# Patient Record
Sex: MALE | Race: WHITE | Employment: UNEMPLOYED | URBAN - METROPOLITAN AREA
[De-identification: names, ages, dates, MRNs, and addresses within clinical notes are randomized per-mention and may not be internally consistent; named-entity substitution may affect disease eponyms.]

---

## 2017-05-15 ENCOUNTER — OFFICE VISIT (OUTPATIENT)
Dept: INTERNAL MEDICINE CLINIC | Age: 57
End: 2017-05-15
Payer: COMMERCIAL

## 2017-05-15 VITALS
BODY MASS INDEX: 34.79 KG/M2 | DIASTOLIC BLOOD PRESSURE: 80 MMHG | SYSTOLIC BLOOD PRESSURE: 122 MMHG | HEIGHT: 70 IN | WEIGHT: 243 LBS

## 2017-05-15 DIAGNOSIS — J01.10 ACUTE FRONTAL SINUSITIS, RECURRENCE NOT SPECIFIED: Primary | ICD-10-CM

## 2017-05-15 DIAGNOSIS — R09.81 SINUS CONGESTION: ICD-10-CM

## 2017-05-15 PROCEDURE — 99213 OFFICE O/P EST LOW 20 MIN: CPT | Performed by: NURSE PRACTITIONER

## 2017-05-15 RX ORDER — CHLORHEXIDINE GLUCONATE 0.12 MG/ML
RINSE ORAL
Refills: 0 | COMMUNITY
Start: 2017-05-09

## 2017-05-15 RX ORDER — FLUTICASONE PROPIONATE 50 MCG
1 SPRAY, SUSPENSION (ML) NASAL DAILY
Qty: 1 BOTTLE | Refills: 3 | Status: SHIPPED | OUTPATIENT
Start: 2017-05-15

## 2017-05-15 RX ORDER — LORATADINE 10 MG/1
10 TABLET ORAL DAILY
Qty: 14 TABLET | Refills: 0 | Status: SHIPPED | OUTPATIENT
Start: 2017-05-15 | End: 2022-10-13

## 2017-05-15 ASSESSMENT — PATIENT HEALTH QUESTIONNAIRE - PHQ9
1. LITTLE INTEREST OR PLEASURE IN DOING THINGS: 0
2. FEELING DOWN, DEPRESSED OR HOPELESS: 0
SUM OF ALL RESPONSES TO PHQ QUESTIONS 1-9: 0
SUM OF ALL RESPONSES TO PHQ9 QUESTIONS 1 & 2: 0

## 2017-05-15 ASSESSMENT — ENCOUNTER SYMPTOMS
RHINORRHEA: 1
SORE THROAT: 1
WHEEZING: 0
SHORTNESS OF BREATH: 1
SINUS PRESSURE: 1
COUGH: 1

## 2020-11-28 ENCOUNTER — HOSPITAL ENCOUNTER (EMERGENCY)
Age: 60
Discharge: HOME | End: 2020-11-28
Attending: EMERGENCY MEDICINE
Payer: COMMERCIAL

## 2020-11-28 VITALS
HEART RATE: 85 BPM | WEIGHT: 250 LBS | DIASTOLIC BLOOD PRESSURE: 95 MMHG | TEMPERATURE: 98.4 F | SYSTOLIC BLOOD PRESSURE: 156 MMHG | RESPIRATION RATE: 18 BRPM | HEIGHT: 70 IN | BODY MASS INDEX: 35.79 KG/M2 | OXYGEN SATURATION: 96 %

## 2020-11-28 PROCEDURE — 96372 THER/PROPH/DIAG INJ SC/IM: CPT

## 2020-11-28 PROCEDURE — 6360000002 HC RX W HCPCS: Performed by: EMERGENCY MEDICINE

## 2020-11-28 PROCEDURE — 99284 EMERGENCY DEPT VISIT MOD MDM: CPT

## 2020-11-28 RX ORDER — KETOROLAC TROMETHAMINE 30 MG/ML
30 INJECTION, SOLUTION INTRAMUSCULAR; INTRAVENOUS ONCE
Status: COMPLETED | OUTPATIENT
Start: 2020-11-28 | End: 2020-11-28

## 2020-11-28 RX ADMIN — KETOROLAC TROMETHAMINE 30 MG: 30 INJECTION, SOLUTION INTRAMUSCULAR; INTRAVENOUS at 10:42

## 2020-11-28 ASSESSMENT — PAIN SCALES - GENERAL
PAINLEVEL_OUTOF10: 8
PAINLEVEL_OUTOF10: 8

## 2020-11-28 ASSESSMENT — ENCOUNTER SYMPTOMS
DIARRHEA: 0
SHORTNESS OF BREATH: 0
ABDOMINAL PAIN: 0
VOMITING: 0
COUGH: 0
NAUSEA: 0
CONSTIPATION: 0

## 2020-11-28 NOTE — ED NOTES
Mode of arrival (squad #, walk in, police, etc) : Walk-in    Chief complaint(s): Foot Pain    Arrival Note (brief scenario, treatment PTA, etc). : Pt presents to ED c/o left foot pain x 3 days. Pt denies any injury. Pt reports this happened before, but he soaked it in Epsom salt and it got better. Pt reports he tried that again with no relief. Pt reports pain worsens with movement and walking. PMS intact. Pt is A&O x4, PWD, eupneic, and no distress noted. C= \"Have you ever felt that you should Cut down on your drinking? \"  No  A= \"Have people Annoyed you by criticizing your drinking? \"  No  G= \"Have you ever felt bad or Guilty about your drinking? \"  No  E= \"Have you ever had a drink as an Eye-opener first thing in the morning to steady your nerves or to help a hangover? \"  No      Deferred []      Reason for deferring: N/A    *If yes to two or more: probable alcohol abuse. Noris Fischer RN  11/28/20 4684

## 2020-11-28 NOTE — ED PROVIDER NOTES
[address] ED  eMERGENCY dEPARTMENT eNCOUnter      Pt Name: Lainey Lazaro  MRN: [id number]  [place] 1960  Date of evaluation: 11/28/20      CHIEF COMPLAINT       Chief Complaint   Patient presents with    Foot Pain     left         HISTORY OF PRESENT ILLNESS    Lainey Lazaro is a 56 y.o. male who presents complaining of foot pain. Patient states that he has been having some pain in his left foot where he has a bunion. Patient states last couple days has been walking quite a bit back-and-forth down to his neighbors to help her and is caused the pain to get worse. Patient has been taking Motrin 800 which seems to take the pain off a bit. Patient states because the walking the pain is gotten worse. Patient has no history of gout. REVIEW OF SYSTEMS       Review of Systems   Constitutional: Negative for chills and fever. Respiratory: Negative for cough and shortness of breath. Cardiovascular: Negative for chest pain and palpitations. Gastrointestinal: Negative for abdominal pain, constipation, diarrhea, nausea and vomiting. Musculoskeletal:        Foot pain   Neurological: Negative for dizziness, seizures and headaches. PAST MEDICAL HISTORY     Past Medical History:   Diagnosis Date    Acute bronchitis     Allergic rhinitis, cause unspecified     Backache, unspecified     Cervicalgia     Cough     Disorders of sacrum     Esophageal reflux     Obesity (BMI 30-39. 9)     Occlusion and stenosis of carotid artery without mention of cerebral infarction     2005    Other abnormal glucose     Other and unspecified hyperlipidemia     Other specified forms of chronic ischemic heart disease     Pain in joint, pelvic region and thigh     PONV (postoperative nausea and vomiting)     Ringing in the ears     Temporomandibular joint disorders, unspecified     Umbilical hernia     Unspecified essential hypertension     no meds    Unspecified vitamin D deficiency     Vasovagal episode     after coughing spell       SURGICAL HISTORY       Past Surgical History:   Procedure Laterality Date    KNEE SURGERY Left     arthroscopy    OTHER SURGICAL HISTORY Bilateral     plantar wart removal    UMBILICAL HERNIA REPAIR  1 4 15    c mesh    WRIST SURGERY Right     reconstructive surgery       CURRENT MEDICATIONS       Current Discharge Medication List      CONTINUE these medications which have NOT CHANGED    Details   chlorhexidine (PERIDEX) 0.12 % solution Rinse with 10 MILLILITERS by mouth then spit out. use twice a day  Refills: 0      loratadine (CLARITIN) 10 MG tablet Take 1 tablet by mouth daily  Qty: 14 tablet, Refills: 0    Associated Diagnoses: Acute frontal sinusitis, recurrence not specified; Sinus congestion      fluticasone (FLONASE) 50 MCG/ACT nasal spray 1 spray by Nasal route daily  Qty: 1 Bottle, Refills: 3    Associated Diagnoses: Acute frontal sinusitis, recurrence not specified; Sinus congestion      aspirin 325 MG tablet Take 325 mg by mouth daily Indications: 3 tabs at hs      Glucosamine-Fish Oil-EPA-DHA (GLUCOSAMINE & FISH OIL PO) Take 1 tablet by mouth daily      FISH OIL 1 tablet daily              ALLERGIES     is allergic to lipitor [atorvastatin]. SOCIAL HISTORY      reports that he quit smoking about 33 years ago. He has never used smokeless tobacco. He reports current alcohol use. He reports current drug use. Drug: Marijuana. PHYSICAL EXAM     INITIAL VITALS: BP (!) 156/95   Pulse 85   Temp 98.4 °F (36.9 °C) (Oral)   Resp 18   Ht 5' 10\" (1.778 m)   Wt 250 lb (113.4 kg)   SpO2 96%   BMI 35.87 kg/m²      Physical Exam  Vitals signs and nursing note reviewed. Constitutional:       General: He is not in acute distress. Appearance: He is well-developed. He is not diaphoretic. HENT:      Head: Normocephalic and atraumatic. Eyes:      General: No scleral icterus. Right eye: No discharge. Left eye: No discharge. Conjunctiva/sclera: Conjunctivae normal.      Pupils: Pupils are equal, round, and reactive to light. Pulmonary:      Effort: Pulmonary effort is normal. No respiratory distress. Musculoskeletal:      Comments: Examination of the left foot shows a bunion on the left with some swelling but no erythema no warmth but there is tenderness. Skin:     General: Skin is warm and dry. Coloration: Skin is not pale. Findings: No erythema or rash. Neurological:      Mental Status: He is alert and oriented to person, place, and time. Psychiatric:         Behavior: Behavior normal.         Thought Content: Thought content normal.         Judgment: Judgment normal.         DIAGNOSTIC RESULTS     RADIOLOGY:All plain film, CT,MRI, and formal ultrasound images (except ED bedside ultrasound) are read by the radiologist and the interpretations are directly viewed by the emergency physician. LABS: All lab results were reviewed by myself, and all abnormals are listed below. Labs Reviewed - No data to display      MEDICAL DECISION MAKING:     Patient symptoms are most likely related to overuse of the bunion with bad shoes as opposed to gout and therefore will have him follow-up with podiatry and continue using anti-inflammatories. EMERGENCY DEPARTMENT COURSE:   Vitals:    Vitals:    11/28/20 0935   BP: (!) 156/95   Pulse: 85   Resp: 18   Temp: 98.4 °F (36.9 °C)   TempSrc: Oral   SpO2: 96%   Weight: 250 lb (113.4 kg)   Height: 5' 10\" (1.778 m)       The patient was given the following medications while in the emergency department:  Orders Placed This Encounter   Medications    ketorolac (TORADOL) injection 30 mg       -------------------------      CONSULTS:  None    PROCEDURES:  None    FINAL IMPRESSION      1.  Left foot pain          DISPOSITION/PLAN   DISPOSITION Decision To Discharge 11/28/2020 10:23:20 AM      PATIENT REFERREDTO:  Santiago Welsh MD  [place] [address] [address] [facility] [address]  [phone]    In 3 days      [address]  [address]  [address] [facility] [address]  [phone]    If symptoms worsen      DISCHARGEMEDICATIONS:  Current Discharge Medication List          (Please note that portions of this note were completed with a voice recognition program.  Efforts were made to edit thedictations but occasionally words are mis-transcribed.)    Charlie Benoit MD  Attending Emergency Physician                        Charlie Benoit MD  11/28/20 5617

## 2020-11-30 ENCOUNTER — TELEPHONE (OUTPATIENT)
Dept: ORTHOPEDIC SURGERY | Age: 60
End: 2020-11-30

## 2020-11-30 NOTE — TELEPHONE ENCOUNTER
CHELSIEM to call and make appt with Dr. Veronica Mercado in the [facility] for tomorrow

## 2020-12-01 ENCOUNTER — TELEPHONE (OUTPATIENT)
Dept: ORTHOPEDIC SURGERY | Age: 60
End: 2020-12-01

## 2020-12-01 NOTE — TELEPHONE ENCOUNTER
----- Message from Karena Trevizo MD sent at 11/29/2020 11:28 AM EST -----  Hi,    I have reviewed this patient's cc'd chart and would like this patient to be scheduled for my clinic. Please let me know if there are any issues with this or any barriers to accommodating this request. Thanks in advance!      Sincerely,    Enrico Valdez MD  Orthopedic Surgery    ----- Message -----  From: Genesis Porras MD  Sent: 11/28/2020  11:01 AM EST  To: Karena Trevizo MD

## 2020-12-02 NOTE — TELEPHONE ENCOUNTER
LVM to call the office and make an appt.   Left the [facility] phone # to call and make an appt

## 2022-01-13 ENCOUNTER — HOSPITAL ENCOUNTER (OUTPATIENT)
Age: 62
Setting detail: SPECIMEN
Discharge: HOME | End: 2022-01-13

## 2022-01-13 ENCOUNTER — OFFICE VISIT (OUTPATIENT)
Dept: INTERNAL MEDICINE CLINIC | Age: 62
End: 2022-01-13
Payer: COMMERCIAL

## 2022-01-13 VITALS
HEIGHT: 70 IN | WEIGHT: 241 LBS | SYSTOLIC BLOOD PRESSURE: 136 MMHG | DIASTOLIC BLOOD PRESSURE: 88 MMHG | HEART RATE: 100 BPM | BODY MASS INDEX: 34.5 KG/M2 | OXYGEN SATURATION: 97 %

## 2022-01-13 DIAGNOSIS — I10 ESSENTIAL HYPERTENSION: ICD-10-CM

## 2022-01-13 DIAGNOSIS — Z12.11 SCREENING FOR COLON CANCER: ICD-10-CM

## 2022-01-13 DIAGNOSIS — G56.03 BILATERAL CARPAL TUNNEL SYNDROME: ICD-10-CM

## 2022-01-13 DIAGNOSIS — I10 ESSENTIAL HYPERTENSION: Primary | ICD-10-CM

## 2022-01-13 DIAGNOSIS — M75.42 IMPINGEMENT SYNDROME OF LEFT SHOULDER: ICD-10-CM

## 2022-01-13 DIAGNOSIS — R73.9 HYPERGLYCEMIA: ICD-10-CM

## 2022-01-13 DIAGNOSIS — E55.9 VITAMIN D DEFICIENCY: ICD-10-CM

## 2022-01-13 DIAGNOSIS — Z13.220 SCREENING FOR HYPERLIPIDEMIA: ICD-10-CM

## 2022-01-13 DIAGNOSIS — E66.09 CLASS 1 OBESITY DUE TO EXCESS CALORIES WITH SERIOUS COMORBIDITY AND BODY MASS INDEX (BMI) OF 34.0 TO 34.9 IN ADULT: ICD-10-CM

## 2022-01-13 DIAGNOSIS — K21.9 GASTROESOPHAGEAL REFLUX DISEASE WITHOUT ESOPHAGITIS: ICD-10-CM

## 2022-01-13 DIAGNOSIS — N40.1 BENIGN LOCALIZED PROSTATIC HYPERPLASIA WITH LOWER URINARY TRACT SYMPTOMS (LUTS): ICD-10-CM

## 2022-01-13 PROBLEM — E66.811 CLASS 1 OBESITY DUE TO EXCESS CALORIES WITH SERIOUS COMORBIDITY AND BODY MASS INDEX (BMI) OF 34.0 TO 34.9 IN ADULT: Status: ACTIVE | Noted: 2022-01-13

## 2022-01-13 LAB
ABSOLUTE EOS #: 0.15 K/UL (ref 0–0.44)
ABSOLUTE IMMATURE GRANULOCYTE: 0.05 K/UL (ref 0–0.3)
ABSOLUTE LYMPH #: 2.74 K/UL (ref 1.1–3.7)
ABSOLUTE MONO #: 0.77 K/UL (ref 0.1–1.2)
ALBUMIN SERPL-MCNC: 4.2 G/DL (ref 3.5–5.2)
ALBUMIN/GLOBULIN RATIO: 1.6 (ref 1–2.5)
ALP BLD-CCNC: 59 U/L (ref 40–129)
ALT SERPL-CCNC: 39 U/L (ref 5–41)
ANION GAP SERPL CALCULATED.3IONS-SCNC: 15 MMOL/L (ref 9–17)
AST SERPL-CCNC: 31 U/L
BASOPHILS # BLD: 1 % (ref 0–2)
BASOPHILS ABSOLUTE: 0.04 K/UL (ref 0–0.2)
BILIRUB SERPL-MCNC: 0.32 MG/DL (ref 0.3–1.2)
BUN BLDV-MCNC: 14 MG/DL (ref 8–23)
BUN/CREAT BLD: ABNORMAL (ref 9–20)
CALCIUM SERPL-MCNC: 9.1 MG/DL (ref 8.6–10.4)
CHLORIDE BLD-SCNC: 98 MMOL/L (ref 98–107)
CHOLESTEROL/HDL RATIO: 16.6
CHOLESTEROL: 432 MG/DL
CO2: 22 MMOL/L (ref 20–31)
CREAT SERPL-MCNC: 0.83 MG/DL (ref 0.7–1.2)
CREATININE URINE: 277.9 MG/DL (ref 39–259)
DIFFERENTIAL TYPE: ABNORMAL
EOSINOPHILS RELATIVE PERCENT: 2 % (ref 1–4)
GFR AFRICAN AMERICAN: >60 ML/MIN
GFR NON-AFRICAN AMERICAN: >60 ML/MIN
GFR SERPL CREATININE-BSD FRML MDRD: ABNORMAL ML/MIN/{1.73_M2}
GFR SERPL CREATININE-BSD FRML MDRD: ABNORMAL ML/MIN/{1.73_M2}
GLUCOSE BLD-MCNC: 303 MG/DL (ref 70–99)
HCT VFR BLD CALC: 48.4 % (ref 40.7–50.3)
HDLC SERPL-MCNC: 26 MG/DL
HEMOGLOBIN: 16.2 G/DL (ref 13–17)
IMMATURE GRANULOCYTES: 1 %
LDL CHOLESTEROL DIRECT: 110 MG/DL
LDL CHOLESTEROL: ABNORMAL MG/DL (ref 0–130)
LYMPHOCYTES # BLD: 38 % (ref 24–43)
MCH RBC QN AUTO: 29.9 PG (ref 25.2–33.5)
MCHC RBC AUTO-ENTMCNC: 33.5 G/DL (ref 28.4–34.8)
MCV RBC AUTO: 89.5 FL (ref 82.6–102.9)
MICROALBUMIN/CREAT 24H UR: 48 MG/L
MICROALBUMIN/CREAT UR-RTO: 17 MCG/MG CREAT
MONOCYTES # BLD: 11 % (ref 3–12)
NRBC AUTOMATED: 0 PER 100 WBC
PDW BLD-RTO: 12.7 % (ref 11.8–14.4)
PLATELET # BLD: 227 K/UL (ref 138–453)
PLATELET ESTIMATE: ABNORMAL
PMV BLD AUTO: 11 FL (ref 8.1–13.5)
POTASSIUM SERPL-SCNC: 4.2 MMOL/L (ref 3.7–5.3)
PROSTATE SPECIFIC ANTIGEN: 1.08 UG/L
RBC # BLD: 5.41 M/UL (ref 4.21–5.77)
RBC # BLD: ABNORMAL 10*6/UL
SEG NEUTROPHILS: 47 % (ref 36–65)
SEGMENTED NEUTROPHILS ABSOLUTE COUNT: 3.52 K/UL (ref 1.5–8.1)
SODIUM BLD-SCNC: 135 MMOL/L (ref 135–144)
TOTAL PROTEIN: 6.8 G/DL (ref 6.4–8.3)
TRIGL SERPL-MCNC: 1251 MG/DL
VLDLC SERPL CALC-MCNC: ABNORMAL MG/DL (ref 1–30)
WBC # BLD: 7.3 K/UL (ref 3.5–11.3)
WBC # BLD: ABNORMAL 10*3/UL

## 2022-01-13 PROCEDURE — G8427 DOCREV CUR MEDS BY ELIG CLIN: HCPCS | Performed by: INTERNAL MEDICINE

## 2022-01-13 PROCEDURE — 1036F TOBACCO NON-USER: CPT | Performed by: INTERNAL MEDICINE

## 2022-01-13 PROCEDURE — 3017F COLORECTAL CA SCREEN DOC REV: CPT | Performed by: INTERNAL MEDICINE

## 2022-01-13 PROCEDURE — G8417 CALC BMI ABV UP PARAM F/U: HCPCS | Performed by: INTERNAL MEDICINE

## 2022-01-13 PROCEDURE — G8484 FLU IMMUNIZE NO ADMIN: HCPCS | Performed by: INTERNAL MEDICINE

## 2022-01-13 PROCEDURE — 99204 OFFICE O/P NEW MOD 45 MIN: CPT | Performed by: INTERNAL MEDICINE

## 2022-01-13 SDOH — ECONOMIC STABILITY: FOOD INSECURITY: WITHIN THE PAST 12 MONTHS, YOU WORRIED THAT YOUR FOOD WOULD RUN OUT BEFORE YOU GOT MONEY TO BUY MORE.: NEVER TRUE

## 2022-01-13 SDOH — ECONOMIC STABILITY: FOOD INSECURITY: WITHIN THE PAST 12 MONTHS, THE FOOD YOU BOUGHT JUST DIDN'T LAST AND YOU DIDN'T HAVE MONEY TO GET MORE.: NEVER TRUE

## 2022-01-13 ASSESSMENT — ENCOUNTER SYMPTOMS
BLOOD IN STOOL: 0
EYE PAIN: 0
WHEEZING: 0
EYE DISCHARGE: 0
COLOR CHANGE: 0
ABDOMINAL DISTENTION: 0
TROUBLE SWALLOWING: 0
DIARRHEA: 0
SHORTNESS OF BREATH: 0
COUGH: 0

## 2022-01-13 ASSESSMENT — PATIENT HEALTH QUESTIONNAIRE - PHQ9
1. LITTLE INTEREST OR PLEASURE IN DOING THINGS: 0
SUM OF ALL RESPONSES TO PHQ QUESTIONS 1-9: 0
1. LITTLE INTEREST OR PLEASURE IN DOING THINGS: 0
SUM OF ALL RESPONSES TO PHQ QUESTIONS 1-9: 0
SUM OF ALL RESPONSES TO PHQ QUESTIONS 1-9: 0
SUM OF ALL RESPONSES TO PHQ9 QUESTIONS 1 & 2: 0
SUM OF ALL RESPONSES TO PHQ QUESTIONS 1-9: 0
2. FEELING DOWN, DEPRESSED OR HOPELESS: 0
2. FEELING DOWN, DEPRESSED OR HOPELESS: 0
SUM OF ALL RESPONSES TO PHQ QUESTIONS 1-9: 0
SUM OF ALL RESPONSES TO PHQ QUESTIONS 1-9: 0
SUM OF ALL RESPONSES TO PHQ9 QUESTIONS 1 & 2: 0
SUM OF ALL RESPONSES TO PHQ QUESTIONS 1-9: 0
SUM OF ALL RESPONSES TO PHQ QUESTIONS 1-9: 0

## 2022-01-13 ASSESSMENT — SOCIAL DETERMINANTS OF HEALTH (SDOH): HOW HARD IS IT FOR YOU TO PAY FOR THE VERY BASICS LIKE FOOD, HOUSING, MEDICAL CARE, AND HEATING?: NOT HARD AT ALL

## 2022-01-13 NOTE — PROGRESS NOTES
[address] [address]  Dept: [phone]  Dept Fax: [phone]    Thad Martin is a 59 y.o. male who presents today for his medical conditions/complaintsas noted below. Thad Martin is c/o of   Chief Complaint   Patient presents with    New Patient         HPI:     HTN  Onset more than 2 years ago  staci mild to mod  Controlled with current no meds  Not associated with headaches or blurry vision  No chest pain          Hemoglobin A1C (%)   Date Value   05/28/2013 6.2 (H)   12/10/2012 6.2 (H)             ( goal A1Cis < 7)   No results found for: LABMICR  LDL Cholesterol (mg/dL)   Date Value   08/12/2015 07/22/2013 129 (H)   05/28/2013 Unable to calc. as TRIG>400       (goal LDL is <100)   AST (U/L)   Date Value   08/12/2015 16     ALT (U/L)   Date Value   08/12/2015 22     BUN (mg/dL)   Date Value   01/04/2016 19     BP Readings from Last 3 Encounters:   01/13/22 136/88   11/28/20 (!) 156/95   05/15/17 122/80          (goal 120/80)    Past Medical History:   Diagnosis Date    Acute bronchitis     Allergic rhinitis, cause unspecified     Backache, unspecified     Cervicalgia     Cough     Disorders of sacrum     Esophageal reflux     Obesity (BMI 30-39. 9)     Occlusion and stenosis of carotid artery without mention of cerebral infarction     2005    Other abnormal glucose     Other and unspecified hyperlipidemia     Other specified forms of chronic ischemic heart disease     Pain in joint, pelvic region and thigh     PONV (postoperative nausea and vomiting)     Ringing in the ears     Temporomandibular joint disorders, unspecified     Umbilical hernia     Unspecified essential hypertension     no meds    Unspecified vitamin D deficiency     Vasovagal episode     after coughing spell      Past Surgical History:   Procedure Laterality Date    KNEE SURGERY Left     arthroscopy    OTHER SURGICAL HISTORY Bilateral plantar wart removal    UMBILICAL HERNIA REPAIR  1 4 15    c mesh    WRIST SURGERY Right     reconstructive surgery       Family History   Problem Relation Age of Onset    Seizures Father         epilepsy    COPD Mother        Social History     Tobacco Use    Smoking status: Former Smoker     Quit date: 1987     Years since quittin.2    Smokeless tobacco: Never Used   Substance Use Topics    Alcohol use: Yes     Alcohol/week: 0.0 standard drinks     Comment: rare      Current Outpatient Medications   Medication Sig Dispense Refill    chlorhexidine (PERIDEX) 0.12 % solution Rinse with 10 MILLILITERS by mouth then spit out. use twice a day  0    loratadine (CLARITIN) 10 MG tablet Take 1 tablet by mouth daily 14 tablet 0    fluticasone (FLONASE) 50 MCG/ACT nasal spray 1 spray by Nasal route daily 1 Bottle 3    aspirin 325 MG tablet Take 325 mg by mouth daily Indications: 3 tabs at hs      Glucosamine-Fish Oil-EPA-DHA (GLUCOSAMINE & FISH OIL PO) Take 1 tablet by mouth daily      FISH OIL 1 tablet daily        No current facility-administered medications for this visit.      Allergies   Allergen Reactions    Lipitor [Atorvastatin] Anaphylaxis     Leg cramps       Health Maintenance   Topic Date Due    Hepatitis C screen  Never done    COVID-19 Vaccine (1) Never done    Depression Screen  Never done    HIV screen  Never done    DTaP/Tdap/Td vaccine (1 - Tdap) Never done    Colon cancer screen colonoscopy  Never done    Shingles Vaccine (1 of 2) Never done    A1C test (Diabetic or Prediabetic)  2014    Potassium monitoring  2017    Creatinine monitoring  2017    Lipid screen  2020    Flu vaccine (1) Never done    Hepatitis A vaccine  Aged Out    Hepatitis B vaccine  Aged Out    Hib vaccine  Aged Out    Meningococcal (ACWY) vaccine  Aged Out    Pneumococcal 0-64 years Vaccine  Aged Out       Subjective:     Review of Systems   Constitutional: Negative for appetite change, diaphoresis and fatigue. HENT: Negative for ear discharge and trouble swallowing. Eyes: Negative for pain and discharge. Respiratory: Negative for cough, shortness of breath and wheezing. Cardiovascular: Negative for chest pain and palpitations. Gastrointestinal: Negative for abdominal distention, blood in stool and diarrhea. Endocrine: Negative for polydipsia and polyphagia. Genitourinary: Negative for difficulty urinating and frequency. Musculoskeletal: Positive for arthralgias. Negative for gait problem, myalgias and neck pain. Skin: Negative for color change and rash. Allergic/Immunologic: Negative for environmental allergies and food allergies. Neurological: Negative for dizziness and headaches. Hematological: Negative for adenopathy. Does not bruise/bleed easily. Psychiatric/Behavioral: Negative for behavioral problems and sleep disturbance. Objective:     Physical Exam  Constitutional:       Appearance: He is well-developed. He is not diaphoretic. Comments: Obese     HENT:      Head: Normocephalic and atraumatic. Eyes:      General:         Right eye: No discharge. Left eye: No discharge. Extraocular Movements:      Right eye: Normal extraocular motion. Left eye: Normal extraocular motion. Conjunctiva/sclera: Conjunctivae normal.      Right eye: Right conjunctiva is not injected. Left eye: Left conjunctiva is not injected. Neck:      Thyroid: No thyroid mass or thyromegaly. Vascular: No JVD. Cardiovascular:      Rate and Rhythm: Normal rate and regular rhythm. Heart sounds: No murmur heard. No friction rub. Pulmonary:      Effort: Pulmonary effort is normal. No tachypnea, bradypnea, accessory muscle usage or respiratory distress. Breath sounds: Normal breath sounds. No wheezing or rales. Abdominal:      General: Bowel sounds are normal. There is no distension. Palpations: Abdomen is soft. Tenderness: There is no abdominal tenderness. There is no rebound. Musculoskeletal:         General: No tenderness. Normal range of motion. Cervical back: Normal range of motion and neck supple. No edema or erythema. Lymphadenopathy:      Head:      Right side of head: No submental or submandibular adenopathy. Left side of head: No submental or submandibular adenopathy. Cervical: No cervical adenopathy. Skin:     General: Skin is warm. Coloration: Skin is not pale. Findings: No bruising, ecchymosis or rash. Neurological:      Mental Status: He is alert and oriented to person, place, and time. Cranial Nerves: No cranial nerve deficit. Sensory: No sensory deficit. Motor: No atrophy or abnormal muscle tone. Coordination: Coordination normal.   Psychiatric:         Mood and Affect: Mood is not anxious. Affect is not angry. Speech: Speech is not slurred. Behavior: Behavior normal. Behavior is not aggressive. Thought Content: Thought content does not include homicidal ideation. Cognition and Memory: Memory is not impaired. /88   Pulse 100   Ht 5' 10\" (1.778 m)   Wt 241 lb (109.3 kg)   SpO2 97%   BMI 34.58 kg/m²     Assessment:       Diagnosis Orders   1. Essential hypertension  Microalbumin, Ur    CBC Auto Differential   2. Screening for colon cancer     3. Screening for hyperlipidemia  Lipid, Fasting    Lipid Panel    Comprehensive Metabolic Panel   4. Impingement syndrome of left shoulder     5. Vitamin D deficiency     6. Gastroesophageal reflux disease without esophagitis     7. Bilateral carpal tunnel syndrome     8. Class 1 obesity due to excess calories with serious comorbidity and body mass index (BMI) of 34.0 to 34.9 in adult     9. Benign localized prostatic hyperplasia with lower urinary tract symptoms (LUTS)  PSA screening   10.  Hyperglycemia  Hemoglobin A1C    Hemoglobin A1C             Plan:      No follow-ups on file. Orders Placed This Encounter   Procedures    Lipid, Fasting     Standing Status:   Future     Standing Expiration Date:   1/12/2023    Hemoglobin A1C     Standing Status:   Future     Standing Expiration Date:   2/13/2022    Lipid Panel     Standing Status:   Future     Standing Expiration Date:   4/13/2022     Order Specific Question:   Is Patient Fasting?/# of Hours     Answer:   10 to 12    Comprehensive Metabolic Panel     Standing Status:   Future     Standing Expiration Date:   4/13/2022    Hemoglobin A1C     Standing Status:   Future     Standing Expiration Date:   4/13/2022    Microalbumin, Ur     Standing Status:   Future     Standing Expiration Date:   4/13/2022    CBC Auto Differential     Standing Status:   Future     Standing Expiration Date:   4/13/2022    PSA screening     Standing Status:   Future     Standing Expiration Date:   4/13/2022     No orders of the defined types were placed in this encounter. new pt   Seen bomanna before ,not here in five year  Concern of dm  Not taking meds  Increased night frequency and thirsty and polyuria  diane do above test  See back in two weeks     Patient given educational materials - see patient instructions. Discussed use, benefit, and side effects of prescribed medications. All patientquestions answered. Pt voiced understanding. Reviewed health maintenance. Instructedto continue current medications, diet and exercise. Patient agreed with treatmentplan. Follow up as directed.      Electronically signed by Phuc Sargent MD on 1/13/2022 at 1:47 PM

## 2022-01-13 NOTE — PROGRESS NOTES
Visit Information    Have you changed or started any medications since your last visit including any over-the-counter medicines, vitamins, or herbal medicines? no   Are you having any side effects from any of your medications? -  no  Have you stopped taking any of your medications? Is so, why? -  no    Have you seen any other physician or provider since your last visit? No  Have you had any other diagnostic tests since your last visit? No  Have you been seen in the emergency room and/or had an admission to a hospital since we last saw you? No  Have you had your routine dental cleaning in the past 6 months? yes -     Have you activated your [employer] account? If not, what are your barriers?  No:      Patient Care Team:  Dana Forte MD as Consulting Physician (Gastroenterology)    Medical History Review  Past Medical, Family, and Social History reviewed and does contribute to the patient presenting condition    Health Maintenance   Topic Date Due    Hepatitis C screen  Never done    COVID-19 Vaccine (1) Never done    Depression Screen  Never done    HIV screen  Never done    DTaP/Tdap/Td vaccine (1 - Tdap) Never done    Colon cancer screen colonoscopy  Never done    Shingles Vaccine (1 of 2) Never done    A1C test (Diabetic or Prediabetic)  05/28/2014    Potassium monitoring  01/04/2017    Creatinine monitoring  01/04/2017    Lipid screen  08/12/2020    Flu vaccine (1) Never done    Hepatitis A vaccine  Aged Out    Hepatitis B vaccine  Aged Out    Hib vaccine  Aged Out    Meningococcal (ACWY) vaccine  Aged Out    Pneumococcal 0-64 years Vaccine  Aged Out

## 2022-01-14 LAB
ESTIMATED AVERAGE GLUCOSE: 355 MG/DL
HBA1C MFR BLD: 14 % (ref 4–6)

## 2022-02-03 ENCOUNTER — TELEMEDICINE (OUTPATIENT)
Dept: INTERNAL MEDICINE CLINIC | Age: 62
End: 2022-02-03
Payer: COMMERCIAL

## 2022-02-03 DIAGNOSIS — E78.1 HYPERTRIGLYCERIDEMIA: ICD-10-CM

## 2022-02-03 PROCEDURE — G8417 CALC BMI ABV UP PARAM F/U: HCPCS | Performed by: INTERNAL MEDICINE

## 2022-02-03 PROCEDURE — 3046F HEMOGLOBIN A1C LEVEL >9.0%: CPT | Performed by: INTERNAL MEDICINE

## 2022-02-03 PROCEDURE — G8428 CUR MEDS NOT DOCUMENT: HCPCS | Performed by: INTERNAL MEDICINE

## 2022-02-03 PROCEDURE — 3017F COLORECTAL CA SCREEN DOC REV: CPT | Performed by: INTERNAL MEDICINE

## 2022-02-03 PROCEDURE — G8484 FLU IMMUNIZE NO ADMIN: HCPCS | Performed by: INTERNAL MEDICINE

## 2022-02-03 PROCEDURE — 2022F DILAT RTA XM EVC RTNOPTHY: CPT | Performed by: INTERNAL MEDICINE

## 2022-02-03 PROCEDURE — 99214 OFFICE O/P EST MOD 30 MIN: CPT | Performed by: INTERNAL MEDICINE

## 2022-02-03 PROCEDURE — 1036F TOBACCO NON-USER: CPT | Performed by: INTERNAL MEDICINE

## 2022-02-03 RX ORDER — FENOFIBRATE 145 MG/1
145 TABLET, COATED ORAL DAILY
Qty: 30 TABLET | Refills: 3 | Status: SHIPPED | OUTPATIENT
Start: 2022-02-03 | End: 2022-07-08

## 2022-02-03 RX ORDER — BLOOD-GLUCOSE METER
1 KIT MISCELLANEOUS 3 TIMES DAILY
Qty: 1 KIT | Refills: 0 | Status: SHIPPED | OUTPATIENT
Start: 2022-02-03

## 2022-02-03 RX ORDER — GLIMEPIRIDE 4 MG/1
4 TABLET ORAL
Qty: 90 TABLET | Refills: 1 | Status: SHIPPED | OUTPATIENT
Start: 2022-02-03 | End: 2022-08-01

## 2022-02-03 NOTE — PROGRESS NOTES
[address] [address]  Dept: [phone]  Dept Fax: [phone]    Maykel Bautista is a 59 y.o. male who presents today for his medical conditions/complaintsas noted below. Maykel Bautista is c/o of   Chief Complaint   Patient presents with    Diabetes         HPI:dm     Diabetes   Duration more than 7 years  Modifying factors on Glucophage and other med  Severity uncontrolled sever  Associated signs and symtoms neuropathy/ckd/ CAD. aggravated with sugar diet and better with low sugar diet     HLD  Onset more than 5 years ago  Severity is mild, not getting worse  Not associated with pancreatitis  Tolerating statin well no muscle pain        Hemoglobin A1C (%)   Date Value   01/13/2022 14.0 (H)   05/28/2013 6.2 (H)   12/10/2012 6.2 (H)             ( goal A1Cis < 7)   Microalb/Crt. Ratio (mcg/mg creat)   Date Value   01/13/2022 17 (H)     LDL Cholesterol (mg/dL)   Date Value   01/13/2022 08/12/2015 07/22/2013 129 (H)       (goal LDL is <100)   AST (U/L)   Date Value   01/13/2022 31     ALT (U/L)   Date Value   01/13/2022 39     BUN (mg/dL)   Date Value   01/13/2022 14     BP Readings from Last 3 Encounters:   01/13/22 136/88   11/28/20 (!) 156/95   05/15/17 122/80          (goal 120/80)    Past Medical History:   Diagnosis Date    Acute bronchitis     Allergic rhinitis, cause unspecified     Backache, unspecified     Cervicalgia     Cough     Disorders of sacrum     Esophageal reflux     Obesity (BMI 30-39. 9)     Occlusion and stenosis of carotid artery without mention of cerebral infarction     2005    Other abnormal glucose     Other and unspecified hyperlipidemia     Other specified forms of chronic ischemic heart disease     Pain in joint, pelvic region and thigh     PONV (postoperative nausea and vomiting)     Ringing in the ears     Temporomandibular joint disorders, unspecified     Umbilical hernia     Unspecified essential hypertension     no meds    Unspecified vitamin D deficiency     Vasovagal episode     after coughing spell      Past Surgical History:   Procedure Laterality Date    KNEE SURGERY Left     arthroscopy    OTHER SURGICAL HISTORY Bilateral     plantar wart removal    UMBILICAL HERNIA REPAIR  1 4 15    c mesh    WRIST SURGERY Right     reconstructive surgery       Family History   Problem Relation Age of Onset    Seizures Father         epilepsy    COPD Mother        Social History     Tobacco Use    Smoking status: Former Smoker     Quit date: 1987     Years since quittin.4    Smokeless tobacco: Never Used   Substance Use Topics    Alcohol use: Yes     Alcohol/week: 0.0 standard drinks     Comment: rare      Current Outpatient Medications   Medication Sig Dispense Refill    fenofibrate (TRICOR) 145 MG tablet Take 1 tablet by mouth daily 30 tablet 3    metFORMIN (GLUCOPHAGE) 1000 MG tablet Take 1 tablet by mouth 2 times daily (with meals) 180 tablet 1    glimepiride (AMARYL) 4 MG tablet Take 1 tablet by mouth every morning (before breakfast) 90 tablet 1    blood glucose test strips (ASCENSIA AUTODISC VI;ONE TOUCH ULTRA TEST VI) strip Use with associated glucose meter. 100 strip 11    glucose monitoring (FREESTYLE FREEDOM) kit 1 kit by Does not apply route 3 times daily 1 kit 0    chlorhexidine (PERIDEX) 0.12 % solution Rinse with 10 MILLILITERS by mouth then spit out. use twice a day  0    loratadine (CLARITIN) 10 MG tablet Take 1 tablet by mouth daily 14 tablet 0    fluticasone (FLONASE) 50 MCG/ACT nasal spray 1 spray by Nasal route daily 1 Bottle 3    aspirin 325 MG tablet Take 325 mg by mouth daily Indications: 3 tabs at hs      Glucosamine-Fish Oil-EPA-DHA (GLUCOSAMINE & FISH OIL PO) Take 1 tablet by mouth daily      FISH OIL 1 tablet daily        No current facility-administered medications for this visit.      Allergies   Allergen Reactions    Lipitor [Atorvastatin] Anaphylaxis     Leg cramps       Health Maintenance   Topic Date Due    COVID-19 Vaccine (1) Never done    Pneumococcal 0-64 years Vaccine (1 - PCV) Never done    Diabetic foot exam  Never done    HIV screen  Never done    Diabetic retinal exam  Never done    Hepatitis C screen  Never done    DTaP/Tdap/Td vaccine (1 - Tdap) Never done    Colorectal Cancer Screen  Never done    Shingles vaccine (1 of 2) Never done    A1C test (Diabetic or Prediabetic)  04/13/2022    Flu vaccine (Season Ended) 09/01/2022    Diabetic microalbuminuria test  01/13/2023    Lipids  01/13/2023    Depression Screen  01/13/2023    Potassium  01/13/2023    Creatinine  01/13/2023    Hepatitis A vaccine  Aged Out    Hib vaccine  Aged Out    Meningococcal (ACWY) vaccine  Aged Out       Subjective:     Review of Systems   Constitutional: Negative for appetite change, diaphoresis and fatigue. HENT: Negative for ear discharge and trouble swallowing. Eyes: Negative for pain and discharge. Respiratory: Negative for cough, shortness of breath and wheezing. Cardiovascular: Negative for chest pain and palpitations. Gastrointestinal: Negative for abdominal distention, blood in stool and diarrhea. Endocrine: Negative for polydipsia and polyphagia. Genitourinary: Negative for difficulty urinating and frequency. Musculoskeletal: Negative for gait problem, myalgias and neck pain. Skin: Negative for color change and rash. Allergic/Immunologic: Negative for environmental allergies and food allergies. Neurological: Negative for dizziness and headaches. Hematological: Negative for adenopathy. Does not bruise/bleed easily. Psychiatric/Behavioral: Negative for behavioral problems and sleep disturbance. Objective:     Physical Exam  Constitutional:       Appearance: He is well-developed. He is not diaphoretic. HENT:      Head: Normocephalic and atraumatic.    Eyes:      General:         Right eye: No discharge. Left eye: No discharge. Extraocular Movements:      Right eye: Normal extraocular motion. Left eye: Normal extraocular motion. Conjunctiva/sclera: Conjunctivae normal.      Right eye: Right conjunctiva is not injected. Left eye: Left conjunctiva is not injected. Neck:      Thyroid: No thyroid mass or thyromegaly. Vascular: No JVD. Cardiovascular:      Rate and Rhythm: Normal rate and regular rhythm. Heart sounds: No murmur heard. No friction rub. Pulmonary:      Effort: Pulmonary effort is normal. No tachypnea, bradypnea, accessory muscle usage or respiratory distress. Breath sounds: Normal breath sounds. No wheezing or rales. Abdominal:      General: Bowel sounds are normal. There is no distension. Palpations: Abdomen is soft. Tenderness: There is no abdominal tenderness. There is no rebound. Musculoskeletal:         General: No tenderness. Normal range of motion. Cervical back: Normal range of motion and neck supple. No edema or erythema. Lymphadenopathy:      Head:      Right side of head: No submental or submandibular adenopathy. Left side of head: No submental or submandibular adenopathy. Cervical: No cervical adenopathy. Skin:     General: Skin is warm. Coloration: Skin is not pale. Findings: No bruising, ecchymosis or rash. Neurological:      Mental Status: He is alert and oriented to person, place, and time. Cranial Nerves: No cranial nerve deficit. Sensory: No sensory deficit. Motor: No atrophy or abnormal muscle tone. Coordination: Coordination normal.   Psychiatric:         Mood and Affect: Mood is not anxious. Affect is not angry. Speech: Speech is not slurred. Behavior: Behavior normal. Behavior is not aggressive. Thought Content: Thought content does not include homicidal ideation. Cognition and Memory: Memory is not impaired.        There were no vitals taken for this visit. Assessment:       Diagnosis Orders   1. DM type 2, uncontrolled, with renal complications ([facility] [address]     2. Hypertriglyceridemia               Plan:      Return in about 1 month (around 3/3/2022). No orders of the defined types were placed in this encounter. Orders Placed This Encounter   Medications    fenofibrate (TRICOR) 145 MG tablet     Sig: Take 1 tablet by mouth daily     Dispense:  30 tablet     Refill:  3    metFORMIN (GLUCOPHAGE) 1000 MG tablet     Sig: Take 1 tablet by mouth 2 times daily (with meals)     Dispense:  180 tablet     Refill:  1    glimepiride (AMARYL) 4 MG tablet     Sig: Take 1 tablet by mouth every morning (before breakfast)     Dispense:  90 tablet     Refill:  1    blood glucose test strips (ASCENSIA AUTODISC VI;ONE TOUCH ULTRA TEST VI) strip     Sig: Use with associated glucose meter. Dispense:  100 strip     Refill:  11    glucose monitoring (FREESTYLE FREEDOM) kit     Si kit by Does not apply route 3 times daily     Dispense:  1 kit     Refill:  0    uncontrolled dm   New onset a1c 14  hypertriglyceride 1200  Advised for \wt loss   Diet anad start above meds  Advised to be under 200 lbs by end of the year  Will see back in a month with log     Patient given educational materials - see patient instructions. Discussed use, benefit, and side effects of prescribed medications. All patientquestions answered. Pt voiced understanding. Reviewed health maintenance. Instructedto continue current medications, diet and exercise. Patient agreed with treatmentplan. Follow up as directed.      Electronically signed by Isaac Mg MD on 2022 at 12:14 PM

## 2022-04-30 ASSESSMENT — ENCOUNTER SYMPTOMS
BLOOD IN STOOL: 0
COUGH: 0
ABDOMINAL DISTENTION: 0
SHORTNESS OF BREATH: 0
COLOR CHANGE: 0
EYE DISCHARGE: 0
DIARRHEA: 0
WHEEZING: 0
TROUBLE SWALLOWING: 0
EYE PAIN: 0

## 2022-05-31 ENCOUNTER — OFFICE VISIT (OUTPATIENT)
Dept: INTERNAL MEDICINE CLINIC | Age: 62
End: 2022-05-31
Payer: COMMERCIAL

## 2022-05-31 VITALS
SYSTOLIC BLOOD PRESSURE: 128 MMHG | WEIGHT: 245 LBS | OXYGEN SATURATION: 97 % | DIASTOLIC BLOOD PRESSURE: 76 MMHG | BODY MASS INDEX: 35.07 KG/M2 | HEIGHT: 70 IN | HEART RATE: 82 BPM

## 2022-05-31 DIAGNOSIS — U07.1 COVID-19 VIRUS INFECTION: Primary | ICD-10-CM

## 2022-05-31 DIAGNOSIS — N48.9 PENILE ABNORMALITY: ICD-10-CM

## 2022-05-31 DIAGNOSIS — E66.01 CLASS 2 SEVERE OBESITY DUE TO EXCESS CALORIES WITH SERIOUS COMORBIDITY AND BODY MASS INDEX (BMI) OF 35.0 TO 35.9 IN ADULT (HCC): ICD-10-CM

## 2022-05-31 DIAGNOSIS — I10 ESSENTIAL HYPERTENSION: ICD-10-CM

## 2022-05-31 DIAGNOSIS — E78.1 HYPERTRIGLYCERIDEMIA: ICD-10-CM

## 2022-05-31 PROBLEM — E66.812 CLASS 2 SEVERE OBESITY DUE TO EXCESS CALORIES WITH SERIOUS COMORBIDITY IN ADULT: Status: ACTIVE | Noted: 2022-05-31

## 2022-05-31 PROBLEM — E66.811 CLASS 1 OBESITY DUE TO EXCESS CALORIES WITH SERIOUS COMORBIDITY AND BODY MASS INDEX (BMI) OF 34.0 TO 34.9 IN ADULT: Status: RESOLVED | Noted: 2022-01-13 | Resolved: 2022-05-31

## 2022-05-31 PROBLEM — E66.09 CLASS 1 OBESITY DUE TO EXCESS CALORIES WITH SERIOUS COMORBIDITY AND BODY MASS INDEX (BMI) OF 34.0 TO 34.9 IN ADULT: Status: RESOLVED | Noted: 2022-01-13 | Resolved: 2022-05-31

## 2022-05-31 LAB — HBA1C MFR BLD: 6.8 %

## 2022-05-31 PROCEDURE — 1036F TOBACCO NON-USER: CPT | Performed by: INTERNAL MEDICINE

## 2022-05-31 PROCEDURE — 2022F DILAT RTA XM EVC RTNOPTHY: CPT | Performed by: INTERNAL MEDICINE

## 2022-05-31 PROCEDURE — G8417 CALC BMI ABV UP PARAM F/U: HCPCS | Performed by: INTERNAL MEDICINE

## 2022-05-31 PROCEDURE — 3017F COLORECTAL CA SCREEN DOC REV: CPT | Performed by: INTERNAL MEDICINE

## 2022-05-31 PROCEDURE — 83036 HEMOGLOBIN GLYCOSYLATED A1C: CPT | Performed by: INTERNAL MEDICINE

## 2022-05-31 PROCEDURE — G8427 DOCREV CUR MEDS BY ELIG CLIN: HCPCS | Performed by: INTERNAL MEDICINE

## 2022-05-31 PROCEDURE — 3044F HG A1C LEVEL LT 7.0%: CPT | Performed by: INTERNAL MEDICINE

## 2022-05-31 PROCEDURE — 99214 OFFICE O/P EST MOD 30 MIN: CPT | Performed by: INTERNAL MEDICINE

## 2022-05-31 ASSESSMENT — ENCOUNTER SYMPTOMS
SHORTNESS OF BREATH: 0
DIARRHEA: 0
ABDOMINAL DISTENTION: 0
WHEEZING: 0
COLOR CHANGE: 0
BLOOD IN STOOL: 0
TROUBLE SWALLOWING: 0
EYE DISCHARGE: 0
COUGH: 0
EYE PAIN: 0

## 2022-05-31 NOTE — PROGRESS NOTES
Visit Information    Have you changed or started any medications since your last visit including any over-the-counter medicines, vitamins, or herbal medicines? no   Are you having any side effects from any of your medications? -  no  Have you stopped taking any of your medications? Is so, why? -  no    Have you seen any other physician or provider since your last visit? No  Have you had any other diagnostic tests since your last visit? Yes - Records Obtained  Have you been seen in the emergency room and/or had an admission to a hospital since we last saw you? No  Have you had your routine dental cleaning in the past 6 months? no    Have you activated your [employer] account? If not, what are your barriers?  Yes     Patient Care Team:  Jerome Kolb MD as PCP - General (Internal Medicine)  Jerome Kolb MD as PCP - [facility]  Stacey Longoria MD as Consulting Physician (Gastroenterology)    Medical History Review  Past Medical, Family, and Social History reviewed and does contribute to the patient presenting condition    Health Maintenance   Topic Date Due    COVID-19 Vaccine (1) Never done    Pneumococcal 0-64 years Vaccine (1 - PCV) Never done    Diabetic foot exam  Never done    HIV screen  Never done    Diabetic retinal exam  Never done    Hepatitis C screen  Never done    DTaP/Tdap/Td vaccine (1 - Tdap) Never done    Colorectal Cancer Screen  Never done    Shingles vaccine (1 of 2) Never done    A1C test (Diabetic or Prediabetic)  04/13/2022    Flu vaccine (Season Ended) 09/01/2022    Diabetic microalbuminuria test  01/13/2023    Lipids  01/13/2023    Depression Screen  01/13/2023    Prostate Specific Antigen (PSA) Screening or Monitoring  01/13/2023    Hepatitis A vaccine  Aged Out    Hib vaccine  Aged Out    Meningococcal (ACWY) vaccine  Aged Out

## 2022-05-31 NOTE — PROGRESS NOTES
[address] [address]  Dept: [phone]  Dept Fax: [phone]    Patricia Kruger is a 59 y.o. male who presents today for his medical conditions/complaintsas noted below. Patricia Kruger is c/o of   Chief Complaint   Patient presents with    Diabetes    Concern For COVID-19     tested positive last Monday     Cough    Referral - General     urologist          HPI:     HTN  Onset more than 2 years ago  staci mild to mod  Controlled with current po meds  Not associated with headaches or blurry vision  No chest pain  HLD  Onset more than 5 years ago  Severity is mild, not getting worse  Not associated with pancreatitis  Tolerating statin well no muscle pain        Hemoglobin A1C (%)   Date Value   05/31/2022 6.8   01/13/2022 14.0 (H)   05/28/2013 6.2 (H)             ( goal A1Cis < 7)   Microalb/Crt. Ratio (mcg/mg creat)   Date Value   01/13/2022 17 (H)     LDL Cholesterol (mg/dL)   Date Value   01/13/2022 08/12/2015 07/22/2013 129 (H)       (goal LDL is <100)   AST (U/L)   Date Value   01/13/2022 31     ALT (U/L)   Date Value   01/13/2022 39     BUN (mg/dL)   Date Value   01/13/2022 14     BP Readings from Last 3 Encounters:   05/31/22 128/76   01/13/22 136/88   11/28/20 (!) 156/95          (goal 120/80)    Past Medical History:   Diagnosis Date    Acute bronchitis     Allergic rhinitis, cause unspecified     Backache, unspecified     Cervicalgia     Cough     Disorders of sacrum     Esophageal reflux     Obesity (BMI 30-39. 9)     Occlusion and stenosis of carotid artery without mention of cerebral infarction     2005    Other abnormal glucose     Other and unspecified hyperlipidemia     Other specified forms of chronic ischemic heart disease     Pain in joint, pelvic region and thigh     PONV (postoperative nausea and vomiting)     Ringing in the ears     Temporomandibular joint disorders, unspecified     Umbilical hernia     Unspecified essential hypertension     no meds    Unspecified vitamin D deficiency     Vasovagal episode     after coughing spell      Past Surgical History:   Procedure Laterality Date    KNEE SURGERY Left     arthroscopy    OTHER SURGICAL HISTORY Bilateral     plantar wart removal    UMBILICAL HERNIA REPAIR  1 4 15    c mesh    WRIST SURGERY Right     reconstructive surgery       Family History   Problem Relation Age of Onset    Seizures Father         epilepsy    COPD Mother        Social History     Tobacco Use    Smoking status: Former Smoker     Quit date: 1987     Years since quittin.5    Smokeless tobacco: Never Used   Substance Use Topics    Alcohol use: Yes     Alcohol/week: 0.0 standard drinks     Comment: rare      Current Outpatient Medications   Medication Sig Dispense Refill    fenofibrate (TRICOR) 145 MG tablet Take 1 tablet by mouth daily 30 tablet 3    metFORMIN (GLUCOPHAGE) 1000 MG tablet Take 1 tablet by mouth 2 times daily (with meals) 180 tablet 1    glimepiride (AMARYL) 4 MG tablet Take 1 tablet by mouth every morning (before breakfast) 90 tablet 1    blood glucose test strips (ASCENSIA AUTODISC VI;ONE TOUCH ULTRA TEST VI) strip Use with associated glucose meter. 100 strip 11    glucose monitoring (FREESTYLE FREEDOM) kit 1 kit by Does not apply route 3 times daily 1 kit 0    chlorhexidine (PERIDEX) 0.12 % solution Rinse with 10 MILLILITERS by mouth then spit out. use twice a day  0    loratadine (CLARITIN) 10 MG tablet Take 1 tablet by mouth daily 14 tablet 0    fluticasone (FLONASE) 50 MCG/ACT nasal spray 1 spray by Nasal route daily 1 Bottle 3    aspirin 325 MG tablet Take 325 mg by mouth daily Indications: 3 tabs at hs      Glucosamine-Fish Oil-EPA-DHA (GLUCOSAMINE & FISH OIL PO) Take 1 tablet by mouth daily      FISH OIL 1 tablet daily        No current facility-administered medications for this visit.      Allergies   Allergen Reactions    Lipitor [Atorvastatin] Anaphylaxis     Leg cramps       Health Maintenance   Topic Date Due    COVID-19 Vaccine (1) Never done    Pneumococcal 0-64 years Vaccine (1 - PCV) Never done    Diabetic foot exam  Never done    HIV screen  Never done    Diabetic retinal exam  Never done    Hepatitis C screen  Never done    DTaP/Tdap/Td vaccine (1 - Tdap) Never done    Colorectal Cancer Screen  Never done    Shingles vaccine (1 of 2) Never done    Flu vaccine (Season Ended) 09/01/2022    Diabetic microalbuminuria test  01/13/2023    Lipids  01/13/2023    Depression Screen  01/13/2023    Prostate Specific Antigen (PSA) Screening or Monitoring  01/13/2023    A1C test (Diabetic or Prediabetic)  05/31/2023    Hepatitis A vaccine  Aged Out    Hib vaccine  Aged Out    Meningococcal (ACWY) vaccine  Aged Out       Subjective:     Review of Systems   Constitutional: Negative for appetite change, diaphoresis and fatigue. HENT: Negative for ear discharge and trouble swallowing. Eyes: Negative for pain and discharge. Respiratory: Negative for cough, shortness of breath and wheezing. Cardiovascular: Negative for chest pain and palpitations. Gastrointestinal: Negative for abdominal distention, blood in stool and diarrhea. Endocrine: Negative for polydipsia and polyphagia. Genitourinary: Negative for difficulty urinating and frequency. Musculoskeletal: Negative for gait problem, myalgias and neck pain. Skin: Negative for color change and rash. Allergic/Immunologic: Negative for environmental allergies and food allergies. Neurological: Negative for dizziness and headaches. Hematological: Negative for adenopathy. Does not bruise/bleed easily. Psychiatric/Behavioral: Negative for behavioral problems and sleep disturbance. Objective:     Physical Exam  Constitutional:       Appearance: He is well-developed. He is not diaphoretic. HENT:      Head: Normocephalic and atraumatic.    Eyes: General:         Right eye: No discharge. Left eye: No discharge. Extraocular Movements:      Right eye: Normal extraocular motion. Left eye: Normal extraocular motion. Conjunctiva/sclera: Conjunctivae normal.      Right eye: Right conjunctiva is not injected. Left eye: Left conjunctiva is not injected. Neck:      Thyroid: No thyroid mass or thyromegaly. Vascular: No JVD. Cardiovascular:      Rate and Rhythm: Normal rate and regular rhythm. Heart sounds: No murmur heard. No friction rub. Pulmonary:      Effort: Pulmonary effort is normal. No tachypnea, bradypnea, accessory muscle usage or respiratory distress. Breath sounds: Normal breath sounds. No wheezing or rales. Abdominal:      General: Bowel sounds are normal. There is no distension. Palpations: Abdomen is soft. Tenderness: There is no abdominal tenderness. There is no rebound. Musculoskeletal:         General: No tenderness. Normal range of motion. Cervical back: Normal range of motion and neck supple. No edema or erythema. Lymphadenopathy:      Head:      Right side of head: No submental or submandibular adenopathy. Left side of head: No submental or submandibular adenopathy. Cervical: No cervical adenopathy. Skin:     General: Skin is warm. Coloration: Skin is not pale. Findings: No bruising, ecchymosis or rash. Neurological:      Mental Status: He is alert and oriented to person, place, and time. Cranial Nerves: No cranial nerve deficit. Sensory: No sensory deficit. Motor: No atrophy or abnormal muscle tone. Coordination: Coordination normal.   Psychiatric:         Mood and Affect: Mood is not anxious. Affect is not angry. Speech: Speech is not slurred. Behavior: Behavior normal. Behavior is not aggressive. Thought Content: Thought content does not include homicidal ideation.          Cognition and Memory: Memory is not impaired. /76   Pulse 82   Ht 5' 10\" (1.778 m)   Wt 245 lb (111.1 kg)   SpO2 97%   BMI 35.15 kg/m²     Assessment:       Diagnosis Orders   1. COVID-19 virus infection     2. DM type 2, uncontrolled, with renal complications (HCC)  POCT glycosylated hemoglobin (Hb A1C)   3. Hypertriglyceridemia  Lipid Panel   4. Essential hypertension     5. Class 2 severe obesity due to excess calories with serious comorbidity and body mass index (BMI) of 35.0 to 35.9 in adult ([address]     6. Penile abnormality  Joel Mcneil MD, Urology, [place]             Plan:      No follow-ups on file. Orders Placed This Encounter   Procedures    Lipid Panel     Standing Status:   Future     Standing Expiration Date:   8/29/2022     Order Specific Question:   Is Patient Fasting?/# of Hours     Answer:   10 to Bárbara Cabrera MD, Urology, [place]     Referral Priority:   Routine     Referral Type:   Eval and Treat     Referral Reason:   Specialty Services Required     Referred to Provider:   Carissa Smith MD     Requested Specialty:   Urology     Number of Visits Requested:   1    POCT glycosylated hemoglobin (Hb A1C)     No orders of the defined types were placed in this encounter. pt claims penile deviation upward  Had to see as it only happens only in erection  Will ref to urology  Pt has cologarud but hasnt returned  Advised to send thestool sample  a1c improved since last virtual visit  From 14 to less than 7  Wt loss advaised  Pt tested positve for covid  A week ago  No fever no cough  Only allergy sytmms  Will recheck triglycerides  Was over 1000     Patient given educational materials - see patient instructions. Discussed use, benefit, and side effects of prescribed medications. All patientquestions answered. Pt voiced understanding. Reviewed health maintenance. Instructedto continue current medications, diet and exercise. Patient agreed with treatmentplan. Follow up as directed.        Please note that this chart was generated using voice recognition Dragon dictation software. Although every effort was made to ensure the accuracy of this automated transcription, some errors in transcription may have occurred.      Electronically signed by Jermaine Mooney MD on 5/31/2022 at 2:16 PM

## 2022-06-09 ENCOUNTER — OFFICE VISIT (OUTPATIENT)
Dept: UROLOGY | Age: 62
End: 2022-06-09
Payer: COMMERCIAL

## 2022-06-09 ENCOUNTER — HOSPITAL ENCOUNTER (OUTPATIENT)
Age: 62
Setting detail: SPECIMEN
Discharge: HOME | End: 2022-06-09

## 2022-06-09 VITALS
WEIGHT: 245 LBS | DIASTOLIC BLOOD PRESSURE: 84 MMHG | HEIGHT: 70 IN | BODY MASS INDEX: 35.07 KG/M2 | TEMPERATURE: 97.3 F | SYSTOLIC BLOOD PRESSURE: 136 MMHG

## 2022-06-09 DIAGNOSIS — E78.1 HYPERTRIGLYCERIDEMIA: ICD-10-CM

## 2022-06-09 DIAGNOSIS — N48.6 PEYRONIE'S DISEASE: Primary | ICD-10-CM

## 2022-06-09 LAB
CHOLESTEROL/HDL RATIO: 5.7
CHOLESTEROL: 232 MG/DL
HDLC SERPL-MCNC: 41 MG/DL
LDL CHOLESTEROL: 144 MG/DL (ref 0–130)
TRIGL SERPL-MCNC: 236 MG/DL

## 2022-06-09 PROCEDURE — 99204 OFFICE O/P NEW MOD 45 MIN: CPT | Performed by: UROLOGY

## 2022-06-09 PROCEDURE — G8427 DOCREV CUR MEDS BY ELIG CLIN: HCPCS | Performed by: UROLOGY

## 2022-06-09 PROCEDURE — G8417 CALC BMI ABV UP PARAM F/U: HCPCS | Performed by: UROLOGY

## 2022-06-09 PROCEDURE — 1036F TOBACCO NON-USER: CPT | Performed by: UROLOGY

## 2022-06-09 PROCEDURE — 3017F COLORECTAL CA SCREEN DOC REV: CPT | Performed by: UROLOGY

## 2022-06-09 RX ORDER — COVID-19 ANTIGEN TEST
KIT MISCELLANEOUS
COMMUNITY
Start: 2022-03-07 | End: 2022-10-13

## 2022-06-09 ASSESSMENT — ENCOUNTER SYMPTOMS
NAUSEA: 0
EYE PAIN: 0
ABDOMINAL PAIN: 0
BACK PAIN: 0
COUGH: 0
VOMITING: 0
DIARRHEA: 0
WHEEZING: 0
CONSTIPATION: 0
SHORTNESS OF BREATH: 0
EYE REDNESS: 0

## 2022-06-09 NOTE — PROGRESS NOTES
[address] [address]  Dept: 92 [address] [facility] Urology Office Note - New Patient    Patient:  Janet Allen  YOB: 1960  Date: 6/9/2022    The patient is a 59 y.o. male who presentstoday for evaluation of the following problems:   Chief Complaint   Patient presents with    New Patient     peyronie's    referred by Tanesha Serrano MD.    HPI  Here for penile curvature. No sig pain, no dysuria, no heaturia, usually urinates ok, not sexually active    (Patient's old records have been requested, reviewed and summarized in today's note.)    Summary of old records: N/A    History: N/A    ProceduresToday: N/A    Urinalysis today:  No results found for this visit on 06/09/22. AUA Symptom Score (6/9/2022): Last BUN andcreatinine:  Lab Results   Component Value Date    BUN 14 01/13/2022     Lab Results   Component Value Date    CREATININE 0.83 01/13/2022       Additional Lab/Culture results: none    Reviewed during this Office Visit: none  (results were independently reviewed byphysician and radiology report verified)    PAST MEDICAL, FAMILY AND SOCIAL HISTORY:  Past Medical History:   Diagnosis Date    Acute bronchitis     Allergic rhinitis, cause unspecified     Backache, unspecified     Cervicalgia     Cough     Disorders of sacrum     Esophageal reflux     Obesity (BMI 30-39. 9)     Occlusion and stenosis of carotid artery without mention of cerebral infarction     2005    Other abnormal glucose     Other and unspecified hyperlipidemia     Other specified forms of chronic ischemic heart disease     Pain in joint, pelvic region and thigh     PONV (postoperative nausea and vomiting)     Ringing in the ears     Temporomandibular joint disorders, unspecified     Umbilical hernia     Unspecified essential hypertension     no meds    Unspecified vitamin D deficiency     Vasovagal episode     after coughing spell     Past Surgical History:   Procedure Laterality Date    KNEE SURGERY Left     arthroscopy    OTHER SURGICAL HISTORY Bilateral     plantar wart removal    UMBILICAL HERNIA REPAIR  1 4 15    c mesh    WRIST SURGERY Right     reconstructive surgery     Family History   Problem Relation Age of Onset    Seizures Father         epilepsy    COPD Mother      No outpatient medications have been marked as taking for the 22 encounter (Office Visit) with Kathrine Pool MD.       Lipitor [atorvastatin]  Social History     Tobacco Use   Smoking Status Former Smoker    Quit date: 1987    Years since quittin.6   Smokeless Tobacco Never Used      (If patient a smoker, smoking cessation counseling offered)   Social History     Substance and Sexual Activity   Alcohol Use Yes    Alcohol/week: 0.0 standard drinks    Comment: rare       REVIEW OF SYSTEMS:  Review of Systems    Physical Exam:    This a 59 y.o. female      Vitals:    22 1419   BP: 136/84   Temp: 97.3 °F (36.3 °C)     Body mass index is 35.15 kg/m². Physical Exam  Constitutional: Patient in no acute distress, ggod grooming, appropriately dressed  Neuro: Alert and oriented to person, place and time. Psych:Mood normal, affect normal  Skin: No rash noted  HEENT: Head: Normocephalic and atraumatic,Conjunctivae and EOM are normal,Nose- normal, Right/Left External Ear: normal, Mouth: Mucosa Moist  Neck: Supple  Lungs: Respiratory effort is normal  Cardiovascular: strong and regular, no lower leg edema  Abdomen: Soft, non-tender, non-distended with no CVA,    Lymphatics: No cervical palpable lymphadenopathy. Bladder non-tender and not distended. Musculoskeletal: Normal gait and station        Assessment and Plan      1. Peyronie's disease            Plan:    cons measures    Prescriptions Ordered:  No orders of the defined types were placed in this encounter. Orders Placed:  Orders Placed This Encounter   Procedures    PSA, Diagnostic     Standing Status:   Future     Standing Expiration Date:   6/9/2023            Carolyn Harper MD    Agree with the ROS entered by the MA.

## 2022-07-08 RX ORDER — FENOFIBRATE 145 MG/1
TABLET, COATED ORAL
Qty: 30 TABLET | Refills: 3 | Status: SHIPPED | OUTPATIENT
Start: 2022-07-08

## 2022-07-12 ENCOUNTER — OFFICE VISIT (OUTPATIENT)
Dept: INTERNAL MEDICINE CLINIC | Age: 62
End: 2022-07-12
Payer: COMMERCIAL

## 2022-07-12 VITALS
BODY MASS INDEX: 36.51 KG/M2 | OXYGEN SATURATION: 97 % | SYSTOLIC BLOOD PRESSURE: 138 MMHG | WEIGHT: 255 LBS | HEART RATE: 77 BPM | HEIGHT: 70 IN | DIASTOLIC BLOOD PRESSURE: 78 MMHG

## 2022-07-12 DIAGNOSIS — E78.1 HYPERTRIGLYCERIDEMIA: ICD-10-CM

## 2022-07-12 DIAGNOSIS — I10 ESSENTIAL HYPERTENSION: ICD-10-CM

## 2022-07-12 DIAGNOSIS — E66.01 CLASS 2 SEVERE OBESITY DUE TO EXCESS CALORIES WITH SERIOUS COMORBIDITY AND BODY MASS INDEX (BMI) OF 35.0 TO 35.9 IN ADULT (HCC): ICD-10-CM

## 2022-07-12 PROCEDURE — G8417 CALC BMI ABV UP PARAM F/U: HCPCS | Performed by: INTERNAL MEDICINE

## 2022-07-12 PROCEDURE — 3044F HG A1C LEVEL LT 7.0%: CPT | Performed by: INTERNAL MEDICINE

## 2022-07-12 PROCEDURE — 99214 OFFICE O/P EST MOD 30 MIN: CPT | Performed by: INTERNAL MEDICINE

## 2022-07-12 PROCEDURE — 1036F TOBACCO NON-USER: CPT | Performed by: INTERNAL MEDICINE

## 2022-07-12 PROCEDURE — 3017F COLORECTAL CA SCREEN DOC REV: CPT | Performed by: INTERNAL MEDICINE

## 2022-07-12 PROCEDURE — 2022F DILAT RTA XM EVC RTNOPTHY: CPT | Performed by: INTERNAL MEDICINE

## 2022-07-12 PROCEDURE — G8427 DOCREV CUR MEDS BY ELIG CLIN: HCPCS | Performed by: INTERNAL MEDICINE

## 2022-07-12 ASSESSMENT — ENCOUNTER SYMPTOMS
COUGH: 0
EYE DISCHARGE: 0
ABDOMINAL DISTENTION: 0
WHEEZING: 0
TROUBLE SWALLOWING: 0
DIARRHEA: 0
SHORTNESS OF BREATH: 0
EYE PAIN: 0
BLOOD IN STOOL: 0
COLOR CHANGE: 0

## 2022-07-12 NOTE — PROGRESS NOTES
Visit Information    Have you changed or started any medications since your last visit including any over-the-counter medicines, vitamins, or herbal medicines? no   Are you having any side effects from any of your medications? -  no  Have you stopped taking any of your medications? Is so, why? -  no    Have you seen any other physician or provider since your last visit? Yes - Records Obtained  Have you had any other diagnostic tests since your last visit? Yes - Records Obtained  Have you been seen in the emergency room and/or had an admission to a hospital since we last saw you? No  Have you had your routine dental cleaning in the past 6 months? no    Have you activated your [employer] account? If not, what are your barriers?  Yes     Patient Care Team:  Bel Benites MD as PCP - General (Internal Medicine)  Bel Benites MD as PCP - [facility] Provider  Zena Olguin MD as Consulting Physician (Gastroenterology)    Medical History Review  Past Medical, Family, and Social History reviewed and does contribute to the patient presenting condition    Health Maintenance   Topic Date Due    COVID-19 Vaccine (1) Never done    Pneumococcal 0-64 years Vaccine (1 - PCV) Never done    Diabetic foot exam  Never done    HIV screen  Never done    Diabetic retinal exam  Never done    Hepatitis C screen  Never done    DTaP/Tdap/Td vaccine (1 - Tdap) Never done    Colorectal Cancer Screen  Never done    Shingles vaccine (1 of 2) Never done    Flu vaccine (1) 09/01/2022    Diabetic microalbuminuria test  01/13/2023    Depression Screen  01/13/2023    Prostate Specific Antigen (PSA) Screening or Monitoring  01/13/2023    A1C test (Diabetic or Prediabetic)  05/31/2023    Lipids  06/09/2023    Hepatitis A vaccine  Aged Out    Hib vaccine  Aged Out    Meningococcal (ACWY) vaccine  Aged Out

## 2022-07-12 NOTE — PROGRESS NOTES
[address] [address]  Dept: [phone]  Dept Fax: [phone]    Miriam Pichardo is a 59 y.o. male who presents today for his medical conditions/complaintsas noted below. Miriam Pichardo is c/o of   Chief Complaint   Patient presents with    Diabetes     5/31/22 a1c 6.8         HPI:     HTN  Onset more than 2 years ago  staci mild to mod  Controlled with current po meds  Not associated with headaches or blurry vision  No chest pain  Diabetes   Duration more than 7 years  Modifying factors on Glucophage and other med  Severity uncontrolled sever  Associated signs and symtoms neuropathy/ckd/ CAD. aggravated with sugar diet and better with low sugar diet           Hemoglobin A1C (%)   Date Value   05/31/2022 6.8   01/13/2022 14.0 (H)   05/28/2013 6.2 (H)             ( goal A1Cis < 7)   Microalb/Crt. Ratio (mcg/mg creat)   Date Value   01/13/2022 17 (H)     LDL Cholesterol (mg/dL)   Date Value   06/09/2022 144 (H)   01/13/2022 08/12/2015            (goal LDL is <100)   AST (U/L)   Date Value   01/13/2022 31     ALT (U/L)   Date Value   01/13/2022 39     BUN (mg/dL)   Date Value   01/13/2022 14     BP Readings from Last 3 Encounters:   07/12/22 138/78   06/09/22 136/84   05/31/22 128/76          (goal 120/80)    Past Medical History:   Diagnosis Date    Acute bronchitis     Allergic rhinitis, cause unspecified     Backache, unspecified     Cervicalgia     Cough     Disorders of sacrum     Esophageal reflux     Obesity (BMI 30-39. 9)     Occlusion and stenosis of carotid artery without mention of cerebral infarction     2005    Other abnormal glucose     Other and unspecified hyperlipidemia     Other specified forms of chronic ischemic heart disease     Pain in joint, pelvic region and thigh     PONV (postoperative nausea and vomiting)     Ringing in the ears     Temporomandibular joint disorders, unspecified     Umbilical hernia  Unspecified essential hypertension     no meds    Unspecified vitamin D deficiency     Vasovagal episode     after coughing spell      Past Surgical History:   Procedure Laterality Date    KNEE SURGERY Left     arthroscopy    OTHER SURGICAL HISTORY Bilateral     plantar wart removal    UMBILICAL HERNIA REPAIR  1 4 15    c mesh    WRIST SURGERY Right     reconstructive surgery       Family History   Problem Relation Age of Onset    Seizures Father         epilepsy    COPD Mother        Social History     Tobacco Use    Smoking status: Former Smoker     Quit date: 1987     Years since quittin.6    Smokeless tobacco: Never Used   Substance Use Topics    Alcohol use: Yes     Alcohol/week: 0.0 standard drinks     Comment: rare      Current Outpatient Medications   Medication Sig Dispense Refill    fenofibrate (TRICOR) 145 MG tablet take 1 tablet by mouth once daily 30 tablet 3    FLOWFLEX COVID-19 AG HOME TEST KIT       metFORMIN (GLUCOPHAGE) 1000 MG tablet Take 1 tablet by mouth 2 times daily (with meals) 180 tablet 1    glimepiride (AMARYL) 4 MG tablet Take 1 tablet by mouth every morning (before breakfast) 90 tablet 1    blood glucose test strips (ASCENSIA AUTODISC VI;ONE TOUCH ULTRA TEST VI) strip Use with associated glucose meter. 100 strip 11    glucose monitoring (FREESTYLE FREEDOM) kit 1 kit by Does not apply route 3 times daily 1 kit 0    chlorhexidine (PERIDEX) 0.12 % solution Rinse with 10 MILLILITERS by mouth then spit out. use twice a day  0    loratadine (CLARITIN) 10 MG tablet Take 1 tablet by mouth daily 14 tablet 0    fluticasone (FLONASE) 50 MCG/ACT nasal spray 1 spray by Nasal route daily 1 Bottle 3    aspirin 325 MG tablet Take 325 mg by mouth daily Indications: 3 tabs at hs      Glucosamine-Fish Oil-EPA-DHA (GLUCOSAMINE & FISH OIL PO) Take 1 tablet by mouth daily      FISH OIL 1 tablet daily        No current facility-administered medications for this visit. Allergies   Allergen Reactions    Lipitor [Atorvastatin] Anaphylaxis     Leg cramps       Health Maintenance   Topic Date Due    COVID-19 Vaccine (1) Never done    Pneumococcal 0-64 years Vaccine (1 - PCV) Never done    Diabetic foot exam  Never done    HIV screen  Never done    Diabetic retinal exam  Never done    Hepatitis C screen  Never done    DTaP/Tdap/Td vaccine (1 - Tdap) Never done    Colorectal Cancer Screen  Never done    Shingles vaccine (1 of 2) Never done    Flu vaccine (1) 09/01/2022    Diabetic microalbuminuria test  01/13/2023    Depression Screen  01/13/2023    Prostate Specific Antigen (PSA) Screening or Monitoring  01/13/2023    A1C test (Diabetic or Prediabetic)  05/31/2023    Lipids  06/09/2023    Hepatitis A vaccine  Aged Out    Hib vaccine  Aged Out    Meningococcal (ACWY) vaccine  Aged Out       Subjective:     Review of Systems   Constitutional: Negative for appetite change, diaphoresis and fatigue. HENT: Negative for ear discharge and trouble swallowing. Eyes: Negative for pain and discharge. Respiratory: Negative for cough, shortness of breath and wheezing. Cardiovascular: Negative for chest pain and palpitations. Gastrointestinal: Negative for abdominal distention, blood in stool and diarrhea. Endocrine: Negative for polydipsia and polyphagia. Genitourinary: Negative for difficulty urinating and frequency. Musculoskeletal: Negative for gait problem, myalgias and neck pain. Skin: Negative for color change and rash. Allergic/Immunologic: Negative for environmental allergies and food allergies. Neurological: Negative for dizziness and headaches. Hematological: Negative for adenopathy. Does not bruise/bleed easily. Psychiatric/Behavioral: Negative for behavioral problems and sleep disturbance. Objective:     Physical Exam  Constitutional:       Appearance: He is well-developed. He is not diaphoretic.       Comments: Truncal obese HENT:      Head: Normocephalic and atraumatic. Eyes:      General:         Right eye: No discharge. Left eye: No discharge. Extraocular Movements:      Right eye: Normal extraocular motion. Left eye: Normal extraocular motion. Conjunctiva/sclera: Conjunctivae normal.      Right eye: Right conjunctiva is not injected. Left eye: Left conjunctiva is not injected. Neck:      Thyroid: No thyroid mass or thyromegaly. Vascular: No JVD. Cardiovascular:      Rate and Rhythm: Normal rate and regular rhythm. Heart sounds: No murmur heard. No friction rub. Pulmonary:      Effort: Pulmonary effort is normal. No tachypnea, bradypnea, accessory muscle usage or respiratory distress. Breath sounds: Normal breath sounds. No wheezing or rales. Abdominal:      General: Bowel sounds are normal. There is no distension. Palpations: Abdomen is soft. Tenderness: There is no abdominal tenderness. There is no rebound. Musculoskeletal:         General: No tenderness. Normal range of motion. Cervical back: Normal range of motion and neck supple. No edema or erythema. Lymphadenopathy:      Head:      Right side of head: No submental or submandibular adenopathy. Left side of head: No submental or submandibular adenopathy. Cervical: No cervical adenopathy. Skin:     General: Skin is warm. Coloration: Skin is not pale. Findings: No bruising, ecchymosis or rash. Neurological:      Mental Status: He is alert and oriented to person, place, and time. Cranial Nerves: No cranial nerve deficit. Sensory: No sensory deficit. Motor: No atrophy or abnormal muscle tone. Coordination: Coordination normal.   Psychiatric:         Mood and Affect: Mood is not anxious. Affect is not angry. Speech: Speech is not slurred. Behavior: Behavior normal. Behavior is not aggressive. Thought Content:  Thought content does not include homicidal ideation. Cognition and Memory: Memory is not impaired. /78   Pulse 77   Ht 5' 10\" (1.778 m)   Wt 255 lb (115.7 kg)   SpO2 97%   BMI 36.59 kg/m²     Assessment:       Diagnosis Orders   1. DM type 2, uncontrolled, with renal complications (HCC)     2. Class 2 severe obesity due to excess calories with serious comorbidity and body mass index (BMI) of 35.0 to 35.9 in adult ([facility] [address]     3. Hypertriglyceridemia     4. Essential hypertension               Plan:      No follow-ups on file. No orders of the defined types were placed in this encounter. No orders of the defined types were placed in this encounter. a1c improved from 14 to 6.8  Wt loss advsied  Rash in righ arm better  Conservative tt  Could not take lipitor  On tricor     Patient given educational materials - see patient instructions. Discussed use, benefit, and side effects of prescribed medications. All patientquestions answered. Pt voiced understanding. Reviewed health maintenance. Instructedto continue current medications, diet and exercise. Patient agreed with treatmentplan. Follow up as directed. Please note that this chart was generated using voice recognition Dragon dictation software. Although every effort was made to ensure the accuracy of this automated transcription, some errors in transcription may have occurred.      Electronically signed by Babatunde Roblero MD on 7/12/2022 at 3:09 PM

## 2022-08-01 RX ORDER — GLIMEPIRIDE 4 MG/1
TABLET ORAL
Qty: 90 TABLET | Refills: 1 | Status: SHIPPED | OUTPATIENT
Start: 2022-08-01

## 2022-10-12 PROBLEM — E11.29 TYPE 2 DIABETES MELLITUS WITH KIDNEY COMPLICATION, WITHOUT LONG-TERM CURRENT USE OF INSULIN (HCC): Status: ACTIVE | Noted: 2022-02-03

## 2022-10-13 ENCOUNTER — OFFICE VISIT (OUTPATIENT)
Dept: INTERNAL MEDICINE CLINIC | Age: 62
End: 2022-10-13
Payer: COMMERCIAL

## 2022-10-13 VITALS
OXYGEN SATURATION: 97 % | DIASTOLIC BLOOD PRESSURE: 84 MMHG | HEART RATE: 66 BPM | BODY MASS INDEX: 37.37 KG/M2 | HEIGHT: 70 IN | SYSTOLIC BLOOD PRESSURE: 152 MMHG | WEIGHT: 261 LBS

## 2022-10-13 DIAGNOSIS — K21.9 GASTROESOPHAGEAL REFLUX DISEASE WITHOUT ESOPHAGITIS: ICD-10-CM

## 2022-10-13 DIAGNOSIS — E78.1 HYPERTRIGLYCERIDEMIA: ICD-10-CM

## 2022-10-13 DIAGNOSIS — E66.01 CLASS 2 SEVERE OBESITY DUE TO EXCESS CALORIES WITH SERIOUS COMORBIDITY AND BODY MASS INDEX (BMI) OF 35.0 TO 35.9 IN ADULT (HCC): ICD-10-CM

## 2022-10-13 DIAGNOSIS — E11.29 TYPE 2 DIABETES MELLITUS WITH OTHER DIABETIC KIDNEY COMPLICATION, WITHOUT LONG-TERM CURRENT USE OF INSULIN (HCC): Primary | ICD-10-CM

## 2022-10-13 DIAGNOSIS — I10 ESSENTIAL HYPERTENSION: ICD-10-CM

## 2022-10-13 LAB — HBA1C MFR BLD: 7.3 %

## 2022-10-13 PROCEDURE — 83036 HEMOGLOBIN GLYCOSYLATED A1C: CPT | Performed by: INTERNAL MEDICINE

## 2022-10-13 PROCEDURE — 2022F DILAT RTA XM EVC RTNOPTHY: CPT | Performed by: INTERNAL MEDICINE

## 2022-10-13 PROCEDURE — 3051F HG A1C>EQUAL 7.0%<8.0%: CPT | Performed by: INTERNAL MEDICINE

## 2022-10-13 PROCEDURE — 3017F COLORECTAL CA SCREEN DOC REV: CPT | Performed by: INTERNAL MEDICINE

## 2022-10-13 PROCEDURE — G8427 DOCREV CUR MEDS BY ELIG CLIN: HCPCS | Performed by: INTERNAL MEDICINE

## 2022-10-13 PROCEDURE — G8417 CALC BMI ABV UP PARAM F/U: HCPCS | Performed by: INTERNAL MEDICINE

## 2022-10-13 PROCEDURE — 99214 OFFICE O/P EST MOD 30 MIN: CPT | Performed by: INTERNAL MEDICINE

## 2022-10-13 PROCEDURE — 1036F TOBACCO NON-USER: CPT | Performed by: INTERNAL MEDICINE

## 2022-10-13 PROCEDURE — G8484 FLU IMMUNIZE NO ADMIN: HCPCS | Performed by: INTERNAL MEDICINE

## 2022-10-13 ASSESSMENT — ENCOUNTER SYMPTOMS
ABDOMINAL DISTENTION: 0
EYE PAIN: 0
SHORTNESS OF BREATH: 0
TROUBLE SWALLOWING: 0
COUGH: 0
COLOR CHANGE: 0
DIARRHEA: 0
WHEEZING: 0
BLOOD IN STOOL: 0
EYE DISCHARGE: 0

## 2022-10-13 NOTE — PROGRESS NOTES
Visit Information    Have you changed or started any medications since your last visit including any over-the-counter medicines, vitamins, or herbal medicines? no   Are you having any side effects from any of your medications? -  no  Have you stopped taking any of your medications? Is so, why? -  no    Have you seen any other physician or provider since your last visit? No  Have you had any other diagnostic tests since your last visit? No  Have you been seen in the emergency room and/or had an admission to a hospital since we last saw you? No  Have you had your routine dental cleaning in the past 6 months? no    Have you activated your [employer] account? If not, what are your barriers?  Yes     Patient Care Team:  Xiomy Ramos MD as PCP - General (Internal Medicine)  [facility]  Thomas Haddad MD as Consulting Physician (Gastroenterology)    Medical History Review  Past Medical, Family, and Social History reviewed and does contribute to the patient presenting condition    Health Maintenance   Topic Date Due    COVID-19 Vaccine (1) Never done    Pneumococcal 0-64 years Vaccine (1 - PCV) Never done    Diabetic foot exam  Never done    HIV screen  Never done    Diabetic retinal exam  Never done    Hepatitis C screen  Never done    DTaP/Tdap/Td vaccine (1 - Tdap) Never done    Colorectal Cancer Screen  Never done    Shingles vaccine (1 of 2) Never done    Flu vaccine (1) Never done    Diabetic microalbuminuria test  01/13/2023    Depression Screen  01/13/2023    A1C test (Diabetic or Prediabetic)  05/31/2023    Lipids  06/09/2023    Hepatitis A vaccine  Aged Out    Hib vaccine  Aged Out    Meningococcal (ACWY) vaccine  Aged Out

## 2022-10-13 NOTE — PROGRESS NOTES
[address] [id number]  Dept: [phone]  Dept Fax: [phone]    Yenny Barrios is a 59 y.o. male who presents today for his medical conditions/complaintsas noted below. Yenny Barrios is c/o of   Chief Complaint   Patient presents with    Diabetes     A1C- 5/31/22= 6.8         HPI:     HTN  Onset more than 2 years ago  staci mild to mod  Controlled with current po meds  Not associated with headaches or blurry vision  No chest pain    Diabetes   Duration more than 7 years  Modifying factors on Glucophage and other med  Severity uncontrolled sever  Associated signs and symtoms neuropathy/ckd/ CAD. aggravated with sugar diet and better with low sugar diet       High cholesterol  On tricor  And intolerant to statin      Hemoglobin A1C (%)   Date Value   10/13/2022 7.3   05/31/2022 6.8   01/13/2022 14.0 (H)             ( goal A1Cis < 7)   Microalb/Crt. Ratio (mcg/mg creat)   Date Value   01/13/2022 17 (H)     LDL Cholesterol (mg/dL)   Date Value   06/09/2022 144 (H)   01/13/2022 08/12/2015            (goal LDL is <100)   AST (U/L)   Date Value   01/13/2022 31     ALT (U/L)   Date Value   01/13/2022 39     BUN (mg/dL)   Date Value   01/13/2022 14     BP Readings from Last 3 Encounters:   10/13/22 (!) 152/84   07/12/22 138/78   06/09/22 136/84          (goal 120/80)    Past Medical History:   Diagnosis Date    Acute bronchitis     Allergic rhinitis, cause unspecified     Backache, unspecified     Cervicalgia     Cough     Disorders of sacrum     Esophageal reflux     Obesity (BMI 30-39. 9)     Occlusion and stenosis of carotid artery without mention of cerebral infarction     2005    Other abnormal glucose     Other and unspecified hyperlipidemia     Other specified forms of chronic ischemic heart disease     Pain in joint, pelvic region and thigh     PONV (postoperative nausea and vomiting)     Ringing in the ears     Temporomandibular joint disorders, unspecified     Umbilical hernia     Unspecified essential hypertension     no meds    Unspecified vitamin D deficiency     Vasovagal episode     after coughing spell      Past Surgical History:   Procedure Laterality Date    KNEE SURGERY Left     arthroscopy    OTHER SURGICAL HISTORY Bilateral     plantar wart removal    UMBILICAL HERNIA REPAIR  1 4 15    c mesh    WRIST SURGERY Right     reconstructive surgery       Family History   Problem Relation Age of Onset    Seizures Father         epilepsy    COPD Mother        Social History     Tobacco Use    Smoking status: Former     Types: Cigarettes     Quit date: 1987     Years since quittin.9    Smokeless tobacco: Never   Substance Use Topics    Alcohol use: Yes     Alcohol/week: 0.0 standard drinks     Comment: rare      Current Outpatient Medications   Medication Sig Dispense Refill    glimepiride (AMARYL) 4 MG tablet take 1 tablet by mouth EVERY MORNING BEFORE BREAKFAST 90 tablet 1    fenofibrate (TRICOR) 145 MG tablet take 1 tablet by mouth once daily 30 tablet 3    metFORMIN (GLUCOPHAGE) 1000 MG tablet Take 1 tablet by mouth 2 times daily (with meals) (Patient taking differently: Take 1,000 mg by mouth daily States he takes 1 every other day) 180 tablet 1    blood glucose test strips (ASCENSIA AUTODISC VI;ONE TOUCH ULTRA TEST VI) strip Use with associated glucose meter. 100 strip 11    glucose monitoring (FREESTYLE FREEDOM) kit 1 kit by Does not apply route 3 times daily 1 kit 0    chlorhexidine (PERIDEX) 0.12 % solution Rinse with 10 MILLILITERS by mouth then spit out. use twice a day  0    fluticasone (FLONASE) 50 MCG/ACT nasal spray 1 spray by Nasal route daily 1 Bottle 3    aspirin 325 MG tablet Take 325 mg by mouth daily 1-2 tabs      Glucosamine-Fish Oil-EPA-DHA (GLUCOSAMINE & FISH OIL PO) Take 1 tablet by mouth daily      FISH OIL 1 tablet daily        No current facility-administered medications for this visit.      Allergies Allergen Reactions    Lipitor [Atorvastatin] Anaphylaxis     Leg cramps       Health Maintenance   Topic Date Due    COVID-19 Vaccine (1) Never done    Pneumococcal 0-64 years Vaccine (1 - PCV) Never done    HIV screen  Never done    Diabetic retinal exam  Never done    Hepatitis C screen  Never done    DTaP/Tdap/Td vaccine (1 - Tdap) Never done    Colorectal Cancer Screen  Never done    Shingles vaccine (1 of 2) Never done    Flu vaccine (1) Never done    Diabetic microalbuminuria test  01/13/2023    Depression Screen  01/13/2023    Lipids  06/09/2023    Diabetic foot exam  10/13/2023    A1C test (Diabetic or Prediabetic)  10/13/2023    Hepatitis A vaccine  Aged Out    Hib vaccine  Aged Out    Meningococcal (ACWY) vaccine  Aged Out       Subjective:     Review of Systems   Constitutional:  Negative for appetite change, diaphoresis and fatigue. HENT:  Negative for ear discharge and trouble swallowing. Eyes:  Negative for pain and discharge. Respiratory:  Negative for cough, shortness of breath and wheezing. Cardiovascular:  Negative for chest pain and palpitations. Gastrointestinal:  Negative for abdominal distention, blood in stool and diarrhea. Endocrine: Negative for polydipsia and polyphagia. Genitourinary:  Negative for difficulty urinating and frequency. Musculoskeletal:  Negative for gait problem, myalgias and neck pain. Skin:  Negative for color change and rash. Allergic/Immunologic: Negative for environmental allergies and food allergies. Neurological:  Negative for dizziness and headaches. Hematological:  Negative for adenopathy. Does not bruise/bleed easily. Psychiatric/Behavioral:  Negative for behavioral problems and sleep disturbance. Objective:     Physical Exam  Constitutional:       Appearance: He is well-developed. He is not diaphoretic. Comments: Obese     HENT:      Head: Normocephalic and atraumatic. Eyes:      General:         Right eye: No discharge. Left eye: No discharge. Extraocular Movements:      Right eye: Normal extraocular motion. Left eye: Normal extraocular motion. Conjunctiva/sclera: Conjunctivae normal.      Right eye: Right conjunctiva is not injected. Left eye: Left conjunctiva is not injected. Neck:      Thyroid: No thyroid mass or thyromegaly. Vascular: No JVD. Cardiovascular:      Rate and Rhythm: Normal rate and regular rhythm. Heart sounds: No murmur heard. No friction rub. Pulmonary:      Effort: Pulmonary effort is normal. No tachypnea, bradypnea, accessory muscle usage or respiratory distress. Breath sounds: Normal breath sounds. No wheezing or rales. Abdominal:      General: Bowel sounds are normal. There is no distension. Palpations: Abdomen is soft. Tenderness: There is no abdominal tenderness. There is no rebound. Musculoskeletal:         General: No tenderness. Normal range of motion. Cervical back: Normal range of motion and neck supple. No edema or erythema. Lymphadenopathy:      Head:      Right side of head: No submental or submandibular adenopathy. Left side of head: No submental or submandibular adenopathy. Cervical: No cervical adenopathy. Skin:     General: Skin is warm. Coloration: Skin is not pale. Findings: No bruising, ecchymosis or rash. Neurological:      Mental Status: He is alert and oriented to person, place, and time. Cranial Nerves: No cranial nerve deficit. Sensory: No sensory deficit. Motor: No atrophy or abnormal muscle tone. Coordination: Coordination normal.   Psychiatric:         Mood and Affect: Mood is not anxious. Affect is not angry. Speech: Speech is not slurred. Behavior: Behavior normal. Behavior is not aggressive. Thought Content: Thought content does not include homicidal ideation. Cognition and Memory: Memory is not impaired.      BP (!) 152/84   Pulse 66 Ht 5' 10\" (1.778 m)   Wt 261 lb (118.4 kg)   SpO2 97%   BMI 37.45 kg/m²     Assessment:       Diagnosis Orders   1. Type 2 diabetes mellitus with other diabetic kidney complication, without long-term current use of insulin ([facility])   DIABETES FOOT EXAM    POCT glycosylated hemoglobin (Hb A1C)      2. Class 2 severe obesity due to excess calories with serious comorbidity and body mass index (BMI) of 35.0 to 35.9 in adult ([facility])   DIABETES FOOT EXAM      3. Hypertriglyceridemia        4. Essential hypertension        5. Gastroesophageal reflux disease without esophagitis                  Plan:      No follow-ups on file. Orders Placed This Encounter   Procedures    POCT glycosylated hemoglobin (Hb A1C)     DIABETES FOOT EXAM     No orders of the defined types were placed in this encounter. Obse wt los advised  A1c 7.3  On metformin taking every other day for side affect  Advsied to complete cologaurd  Pt agrees  He has the box  Ldl is high  On tricor  Shingles vaccine  Advsied     Patient given educational materials - see patient instructions. Discussed use, benefit, and side effects of prescribed medications. All patientquestions answered. Pt voiced understanding. Reviewed health maintenance. Instructedto continue current medications, diet and exercise. Patient agreed with treatmentplan. Follow up as directed. Please note that this chart was generated using voice recognition Dragon dictation software. Although every effort was made to ensure the accuracy of this automated transcription, some errors in transcription may have occurred.      Electronically signed by Beba Abbott MD on 10/13/2022 at 2:33 PM

## 2024-05-15 ENCOUNTER — TELEPHONE (OUTPATIENT)
Dept: INTERNAL MEDICINE CLINIC | Age: 64
End: 2024-05-15

## 2024-05-15 NOTE — TELEPHONE ENCOUNTER
----- Message from Alexandre Rivera sent at 5/15/2024 12:38 PM EDT -----  Regarding: [facility] Appointment Request  E[facility] Appointment Request    Patient needs appointment for ECC Appointment Type: Existing Condition Follow Up.    Reason for Appointment Request: Available appointments did not meet patient need  --------------------------------------------------------------------------------------------------------------------------    Relationship to Patient: Self     Call Back Information: OK to leave message on voicemail  Preferred Call Back Number: Phone [phone]         Patient wants to have an earlier appointment as possible.

## 2024-05-28 ENCOUNTER — HOSPITAL ENCOUNTER (OUTPATIENT)
Age: 64
Setting detail: SPECIMEN
Discharge: HOME | End: 2024-05-28

## 2024-05-28 ENCOUNTER — OFFICE VISIT (OUTPATIENT)
Dept: INTERNAL MEDICINE CLINIC | Age: 64
End: 2024-05-28
Payer: MEDICARE

## 2024-05-28 VITALS
OXYGEN SATURATION: 95 % | BODY MASS INDEX: 34.44 KG/M2 | DIASTOLIC BLOOD PRESSURE: 86 MMHG | HEART RATE: 81 BPM | WEIGHT: 240 LBS | SYSTOLIC BLOOD PRESSURE: 130 MMHG

## 2024-05-28 DIAGNOSIS — E55.9 VITAMIN D DEFICIENCY: ICD-10-CM

## 2024-05-28 DIAGNOSIS — E66.01 CLASS 2 SEVERE OBESITY DUE TO EXCESS CALORIES WITH SERIOUS COMORBIDITY AND BODY MASS INDEX (BMI) OF 35.0 TO 35.9 IN ADULT (HCC): ICD-10-CM

## 2024-05-28 DIAGNOSIS — Z12.5 PROSTATE CANCER SCREENING: ICD-10-CM

## 2024-05-28 DIAGNOSIS — Z91.199 NONCOMPLIANCE: ICD-10-CM

## 2024-05-28 DIAGNOSIS — E78.1 HYPERTRIGLYCERIDEMIA: ICD-10-CM

## 2024-05-28 DIAGNOSIS — I10 ESSENTIAL HYPERTENSION: ICD-10-CM

## 2024-05-28 DIAGNOSIS — E11.29 TYPE 2 DIABETES MELLITUS WITH OTHER DIABETIC KIDNEY COMPLICATION, WITHOUT LONG-TERM CURRENT USE OF INSULIN (HCC): ICD-10-CM

## 2024-05-28 DIAGNOSIS — E11.29 TYPE 2 DIABETES MELLITUS WITH OTHER DIABETIC KIDNEY COMPLICATION, WITHOUT LONG-TERM CURRENT USE OF INSULIN (HCC): Primary | ICD-10-CM

## 2024-05-28 DIAGNOSIS — R42 DIZZINESS: ICD-10-CM

## 2024-05-28 DIAGNOSIS — Z12.11 COLON CANCER SCREENING: ICD-10-CM

## 2024-05-28 LAB
25(OH)D3 SERPL-MCNC: 23.6 NG/ML (ref 30–100)
ALBUMIN SERPL-MCNC: 4.3 G/DL (ref 3.5–5.2)
ALBUMIN/GLOB SERPL: 1 {RATIO} (ref 1–2.5)
ALP SERPL-CCNC: 48 U/L (ref 40–129)
ALT SERPL-CCNC: 20 U/L (ref 10–50)
ANION GAP SERPL CALCULATED.3IONS-SCNC: 13 MMOL/L (ref 9–16)
AST SERPL-CCNC: 16 U/L (ref 10–50)
BASOPHILS # BLD: 0.04 K/UL (ref 0–0.2)
BASOPHILS NFR BLD: 1 % (ref 0–2)
BILIRUB SERPL-MCNC: 0.3 MG/DL (ref 0–1.2)
BILIRUB UR QL STRIP: NEGATIVE
BUN SERPL-MCNC: 15 MG/DL (ref 8–23)
CALCIUM SERPL-MCNC: 9.4 MG/DL (ref 8.6–10.4)
CHLORIDE SERPL-SCNC: 98 MMOL/L (ref 98–107)
CHOLEST SERPL-MCNC: 322 MG/DL (ref 0–199)
CHOLESTEROL/HDL RATIO: 9
CLARITY UR: CLEAR
CO2 SERPL-SCNC: 24 MMOL/L (ref 20–31)
COLOR UR: YELLOW
COMMENT: ABNORMAL
CREAT SERPL-MCNC: 0.9 MG/DL (ref 0.7–1.2)
CREAT UR-MCNC: 190 MG/DL (ref 39–259)
EOSINOPHIL # BLD: 0.14 K/UL (ref 0–0.44)
EOSINOPHILS RELATIVE PERCENT: 2 % (ref 1–4)
ERYTHROCYTE [DISTWIDTH] IN BLOOD BY AUTOMATED COUNT: 13 % (ref 11.8–14.4)
GFR, ESTIMATED: >90 ML/MIN/1.73M2
GLUCOSE SERPL-MCNC: 322 MG/DL (ref 74–99)
GLUCOSE UR STRIP-MCNC: ABNORMAL MG/DL
HBA1C MFR BLD: 12.9 %
HCT VFR BLD AUTO: 50.2 % (ref 40.7–50.3)
HDLC SERPL-MCNC: 37 MG/DL
HGB BLD-MCNC: 16.4 G/DL (ref 13–17)
HGB UR QL STRIP.AUTO: NEGATIVE
IMM GRANULOCYTES # BLD AUTO: 0.03 K/UL (ref 0–0.3)
IMM GRANULOCYTES NFR BLD: 0 %
KETONES UR STRIP-MCNC: ABNORMAL MG/DL
LDLC SERPL CALC-MCNC: ABNORMAL MG/DL (ref 0–100)
LEUKOCYTE ESTERASE UR QL STRIP: NEGATIVE
LYMPHOCYTES NFR BLD: 2.1 K/UL (ref 1.1–3.7)
LYMPHOCYTES RELATIVE PERCENT: 30 % (ref 24–43)
MCH RBC QN AUTO: 29.8 PG (ref 25.2–33.5)
MCHC RBC AUTO-ENTMCNC: 32.7 G/DL (ref 28.4–34.8)
MCV RBC AUTO: 91.3 FL (ref 82.6–102.9)
MICROALBUMIN UR-MCNC: 23 MG/L (ref 0–20)
MICROALBUMIN/CREAT UR-RTO: 12 MCG/MG CREAT (ref 0–17)
MONOCYTES NFR BLD: 0.7 K/UL (ref 0.1–1.2)
MONOCYTES NFR BLD: 10 % (ref 3–12)
NEUTROPHILS NFR BLD: 57 % (ref 36–65)
NEUTS SEG NFR BLD: 4.05 K/UL (ref 1.5–8.1)
NITRITE UR QL STRIP: NEGATIVE
NRBC BLD-RTO: 0 PER 100 WBC
PH UR STRIP: 5.5 [PH] (ref 5–8)
PLATELET # BLD AUTO: 230 K/UL (ref 138–453)
PMV BLD AUTO: 10.5 FL (ref 8.1–13.5)
POTASSIUM SERPL-SCNC: 4.7 MMOL/L (ref 3.7–5.3)
PROT SERPL-MCNC: 7.4 G/DL (ref 6.6–8.7)
PROT UR STRIP-MCNC: NEGATIVE MG/DL
PSA SERPL-MCNC: 1.5 NG/ML (ref 0–4)
RBC # BLD AUTO: 5.5 M/UL (ref 4.21–5.77)
SODIUM SERPL-SCNC: 135 MMOL/L (ref 136–145)
SP GR UR STRIP: 1.04 (ref 1–1.03)
TRIGL SERPL-MCNC: 638 MG/DL
TSH SERPL DL<=0.05 MIU/L-ACNC: 1.83 UIU/ML (ref 0.27–4.2)
UROBILINOGEN UR STRIP-ACNC: NORMAL EU/DL (ref 0–1)
VLDLC SERPL CALC-MCNC: ABNORMAL MG/DL
WBC OTHER # BLD: 7.1 K/UL (ref 3.5–11.3)

## 2024-05-28 PROCEDURE — 3046F HEMOGLOBIN A1C LEVEL >9.0%: CPT | Performed by: INTERNAL MEDICINE

## 2024-05-28 PROCEDURE — G8427 DOCREV CUR MEDS BY ELIG CLIN: HCPCS | Performed by: INTERNAL MEDICINE

## 2024-05-28 PROCEDURE — 83036 HEMOGLOBIN GLYCOSYLATED A1C: CPT | Performed by: INTERNAL MEDICINE

## 2024-05-28 PROCEDURE — 3079F DIAST BP 80-89 MM HG: CPT | Performed by: INTERNAL MEDICINE

## 2024-05-28 PROCEDURE — 3075F SYST BP GE 130 - 139MM HG: CPT | Performed by: INTERNAL MEDICINE

## 2024-05-28 PROCEDURE — G8417 CALC BMI ABV UP PARAM F/U: HCPCS | Performed by: INTERNAL MEDICINE

## 2024-05-28 PROCEDURE — 3017F COLORECTAL CA SCREEN DOC REV: CPT | Performed by: INTERNAL MEDICINE

## 2024-05-28 PROCEDURE — 1036F TOBACCO NON-USER: CPT | Performed by: INTERNAL MEDICINE

## 2024-05-28 PROCEDURE — 1123F ACP DISCUSS/DSCN MKR DOCD: CPT | Performed by: INTERNAL MEDICINE

## 2024-05-28 PROCEDURE — 99214 OFFICE O/P EST MOD 30 MIN: CPT | Performed by: INTERNAL MEDICINE

## 2024-05-28 PROCEDURE — 2022F DILAT RTA XM EVC RTNOPTHY: CPT | Performed by: INTERNAL MEDICINE

## 2024-05-28 SDOH — ECONOMIC STABILITY: HOUSING INSECURITY
IN THE LAST 12 MONTHS, WAS THERE A TIME WHEN YOU DID NOT HAVE A STEADY PLACE TO SLEEP OR SLEPT IN A SHELTER (INCLUDING NOW)?: NO

## 2024-05-28 SDOH — ECONOMIC STABILITY: FOOD INSECURITY: WITHIN THE PAST 12 MONTHS, YOU WORRIED THAT YOUR FOOD WOULD RUN OUT BEFORE YOU GOT MONEY TO BUY MORE.: NEVER TRUE

## 2024-05-28 SDOH — ECONOMIC STABILITY: INCOME INSECURITY: HOW HARD IS IT FOR YOU TO PAY FOR THE VERY BASICS LIKE FOOD, HOUSING, MEDICAL CARE, AND HEATING?: NOT HARD AT ALL

## 2024-05-28 SDOH — ECONOMIC STABILITY: FOOD INSECURITY: WITHIN THE PAST 12 MONTHS, THE FOOD YOU BOUGHT JUST DIDN'T LAST AND YOU DIDN'T HAVE MONEY TO GET MORE.: NEVER TRUE

## 2024-05-28 ASSESSMENT — PATIENT HEALTH QUESTIONNAIRE - PHQ9
SUM OF ALL RESPONSES TO PHQ QUESTIONS 1-9: 0
SUM OF ALL RESPONSES TO PHQ QUESTIONS 1-9: 0
1. LITTLE INTEREST OR PLEASURE IN DOING THINGS: NOT AT ALL
SUM OF ALL RESPONSES TO PHQ QUESTIONS 1-9: 0
2. FEELING DOWN, DEPRESSED OR HOPELESS: NOT AT ALL
SUM OF ALL RESPONSES TO PHQ9 QUESTIONS 1 & 2: 0
SUM OF ALL RESPONSES TO PHQ QUESTIONS 1-9: 0

## 2024-05-28 NOTE — PROGRESS NOTES
Visit Information    Have you changed or started any medications since your last visit including any over-the-counter medicines, vitamins, or herbal medicines? no   Are you having any side effects from any of your medications? -  no  Have you stopped taking any of your medications? Is so, why? -  no    Have you seen any other physician or provider since your last visit? Yes - Records Obtained  Have you had any other diagnostic tests since your last visit? Yes - Records Obtained  Have you been seen in the emergency room and/or had an admission to a hospital since we last saw you? Yes - Records Obtained  Have you had your routine dental cleaning in the past 6 months? no    Have you activated your Mine account? If not, what are your barriers? Yes     Patient Care Team:  Kennedy Cazares MD as PCP - General (Internal Medicine)  Kennedy Cazares MD as PCP - Empaneled Provider  Coty Cornejo MD as Consulting Physician (Gastroenterology)    Medical History Review  Past Medical, Family, and Social History reviewed and does contribute to the patient presenting condition    Health Maintenance   Topic Date Due    COVID-19 Vaccine (1) Never done    Pneumococcal 65+ years Vaccine (1 of 2 - PCV) Never done    Depression Screen  Never done    HIV screen  Never done    Diabetic retinal exam  Never done    Hepatitis C screen  Never done    DTaP/Tdap/Td vaccine (1 - Tdap) Never done    Colorectal Cancer Screen  Never done    Shingles vaccine (1 of 2) Never done    Respiratory Syncytial Virus (RSV) Pregnant or age 60 yrs+ (1 - 1-dose 60+ series) Never done    Diabetic Alb to Cr ratio (uACR) test  01/13/2023    GFR test (Diabetes, CKD 3-4, OR last GFR 15-59)  01/13/2023    Lipids  06/09/2023    Diabetic foot exam  10/13/2023    A1C test (Diabetic or Prediabetic)  10/13/2023    AAA screen  Never done    Flu vaccine (Season Ended) 08/01/2024    Hepatitis A vaccine  Aged Out    Hepatitis B vaccine  Aged Out    Hib vaccine  Aged 
      Electronically signed by Erica Lane MD on 5/29/2024 at 1:52 PM

## 2024-05-29 PROBLEM — Z91.199 NONCOMPLIANCE: Status: ACTIVE | Noted: 2024-05-29

## 2024-05-29 PROBLEM — R42 DIZZINESS: Status: ACTIVE | Noted: 2024-05-29

## 2024-05-29 LAB — LDLC SERPL DIRECT ASSAY-MCNC: 140 MG/DL

## 2024-06-08 ENCOUNTER — APPOINTMENT (OUTPATIENT)
Dept: GENERAL RADIOLOGY | Age: 64
DRG: 190 | End: 2024-06-08
Payer: MEDICARE

## 2024-06-08 ENCOUNTER — HOSPITAL ENCOUNTER (INPATIENT)
Age: 64
LOS: 2 days | Discharge: HOME | DRG: 190 | End: 2024-06-10
Attending: EMERGENCY MEDICINE | Admitting: FAMILY MEDICINE
Payer: MEDICARE

## 2024-06-08 DIAGNOSIS — J44.1 COPD EXACERBATION (HCC): Primary | ICD-10-CM

## 2024-06-08 PROBLEM — J96.01 ACUTE HYPOXIC RESPIRATORY FAILURE (HCC): Status: ACTIVE | Noted: 2024-06-08

## 2024-06-08 LAB
ANION GAP SERPL CALCULATED.3IONS-SCNC: 14 MMOL/L (ref 9–16)
BASOPHILS # BLD: 0.04 K/UL (ref 0–0.2)
BASOPHILS NFR BLD: 1 % (ref 0–2)
BNP SERPL-MCNC: <36 PG/ML (ref 0–300)
BUN SERPL-MCNC: 17 MG/DL (ref 8–23)
CALCIUM SERPL-MCNC: 9.2 MG/DL (ref 8.6–10.4)
CHLORIDE SERPL-SCNC: 98 MMOL/L (ref 98–107)
CO2 SERPL-SCNC: 22 MMOL/L (ref 20–31)
CREAT SERPL-MCNC: 0.8 MG/DL (ref 0.7–1.2)
D DIMER PPP FEU-MCNC: <0.27 UG/ML FEU (ref 0–0.57)
EOSINOPHIL # BLD: 0.14 K/UL (ref 0–0.44)
EOSINOPHILS RELATIVE PERCENT: 2 % (ref 1–4)
ERYTHROCYTE [DISTWIDTH] IN BLOOD BY AUTOMATED COUNT: 12.8 % (ref 11.8–14.4)
GFR, ESTIMATED: >90 ML/MIN/1.73M2
GLUCOSE BLD-MCNC: 281 MG/DL (ref 75–110)
GLUCOSE SERPL-MCNC: 315 MG/DL (ref 74–99)
HCT VFR BLD AUTO: 49.2 % (ref 40.7–50.3)
HGB BLD-MCNC: 16.5 G/DL (ref 13–17)
IMM GRANULOCYTES # BLD AUTO: <0.03 K/UL (ref 0–0.3)
IMM GRANULOCYTES NFR BLD: 0 %
LYMPHOCYTES NFR BLD: 2.74 K/UL (ref 1.1–3.7)
LYMPHOCYTES RELATIVE PERCENT: 38 % (ref 24–43)
MCH RBC QN AUTO: 29.5 PG (ref 25.2–33.5)
MCHC RBC AUTO-ENTMCNC: 33.5 G/DL (ref 28.4–34.8)
MCV RBC AUTO: 88 FL (ref 82.6–102.9)
MONOCYTES NFR BLD: 0.73 K/UL (ref 0.1–1.2)
MONOCYTES NFR BLD: 10 % (ref 3–12)
NEUTROPHILS NFR BLD: 49 % (ref 36–65)
NEUTS SEG NFR BLD: 3.55 K/UL (ref 1.5–8.1)
NRBC BLD-RTO: 0 PER 100 WBC
PLATELET # BLD AUTO: 238 K/UL (ref 138–453)
PMV BLD AUTO: 9.8 FL (ref 8.1–13.5)
POTASSIUM SERPL-SCNC: 3.9 MMOL/L (ref 3.7–5.3)
RBC # BLD AUTO: 5.59 M/UL (ref 4.21–5.77)
SODIUM SERPL-SCNC: 134 MMOL/L (ref 136–145)
TROPONIN I SERPL HS-MCNC: 7 NG/L (ref 0–22)
TROPONIN I SERPL HS-MCNC: 8 NG/L (ref 0–22)
WBC OTHER # BLD: 7.2 K/UL (ref 3.5–11.3)

## 2024-06-08 PROCEDURE — 93005 ELECTROCARDIOGRAM TRACING: CPT | Performed by: STUDENT IN AN ORGANIZED HEALTH CARE EDUCATION/TRAINING PROGRAM

## 2024-06-08 PROCEDURE — 1200000000 HC SEMI PRIVATE

## 2024-06-08 PROCEDURE — 94761 N-INVAS EAR/PLS OXIMETRY MLT: CPT

## 2024-06-08 PROCEDURE — 99222 1ST HOSP IP/OBS MODERATE 55: CPT | Performed by: HOSPITALIST

## 2024-06-08 PROCEDURE — 85025 COMPLETE CBC W/AUTO DIFF WBC: CPT

## 2024-06-08 PROCEDURE — 2700000000 HC OXYGEN THERAPY PER DAY

## 2024-06-08 PROCEDURE — 94640 AIRWAY INHALATION TREATMENT: CPT

## 2024-06-08 PROCEDURE — 83880 ASSAY OF NATRIURETIC PEPTIDE: CPT

## 2024-06-08 PROCEDURE — 71045 X-RAY EXAM CHEST 1 VIEW: CPT

## 2024-06-08 PROCEDURE — G0378 HOSPITAL OBSERVATION PER HR: HCPCS

## 2024-06-08 PROCEDURE — 82947 ASSAY GLUCOSE BLOOD QUANT: CPT

## 2024-06-08 PROCEDURE — 2580000003 HC RX 258: Performed by: HOSPITALIST

## 2024-06-08 PROCEDURE — 6370000000 HC RX 637 (ALT 250 FOR IP): Performed by: HOSPITALIST

## 2024-06-08 PROCEDURE — 6360000002 HC RX W HCPCS: Performed by: HOSPITALIST

## 2024-06-08 PROCEDURE — 96374 THER/PROPH/DIAG INJ IV PUSH: CPT

## 2024-06-08 PROCEDURE — 96376 TX/PRO/DX INJ SAME DRUG ADON: CPT

## 2024-06-08 PROCEDURE — 80048 BASIC METABOLIC PNL TOTAL CA: CPT

## 2024-06-08 PROCEDURE — 85379 FIBRIN DEGRADATION QUANT: CPT

## 2024-06-08 PROCEDURE — 6370000000 HC RX 637 (ALT 250 FOR IP): Performed by: STUDENT IN AN ORGANIZED HEALTH CARE EDUCATION/TRAINING PROGRAM

## 2024-06-08 PROCEDURE — 84484 ASSAY OF TROPONIN QUANT: CPT

## 2024-06-08 PROCEDURE — 99285 EMERGENCY DEPT VISIT HI MDM: CPT

## 2024-06-08 RX ORDER — ALBUTEROL SULFATE 90 UG/1
2 AEROSOL, METERED RESPIRATORY (INHALATION) EVERY 4 HOURS PRN
Status: DISCONTINUED | OUTPATIENT
Start: 2024-06-08 | End: 2024-06-10 | Stop reason: HOSPADM

## 2024-06-08 RX ORDER — ACETAMINOPHEN 650 MG/1
650 SUPPOSITORY RECTAL EVERY 6 HOURS PRN
Status: DISCONTINUED | OUTPATIENT
Start: 2024-06-08 | End: 2024-06-10 | Stop reason: HOSPADM

## 2024-06-08 RX ORDER — GLUCAGON 1 MG/ML
1 KIT INJECTION PRN
Status: DISCONTINUED | OUTPATIENT
Start: 2024-06-08 | End: 2024-06-10 | Stop reason: HOSPADM

## 2024-06-08 RX ORDER — INSULIN LISPRO 100 [IU]/ML
0-4 INJECTION, SOLUTION INTRAVENOUS; SUBCUTANEOUS NIGHTLY
Status: DISCONTINUED | OUTPATIENT
Start: 2024-06-08 | End: 2024-06-10 | Stop reason: HOSPADM

## 2024-06-08 RX ORDER — POLYETHYLENE GLYCOL 3350 17 G/17G
17 POWDER, FOR SOLUTION ORAL DAILY PRN
Status: DISCONTINUED | OUTPATIENT
Start: 2024-06-08 | End: 2024-06-10 | Stop reason: HOSPADM

## 2024-06-08 RX ORDER — DEXTROSE MONOHYDRATE 100 MG/ML
INJECTION, SOLUTION INTRAVENOUS CONTINUOUS PRN
Status: DISCONTINUED | OUTPATIENT
Start: 2024-06-08 | End: 2024-06-10 | Stop reason: HOSPADM

## 2024-06-08 RX ORDER — MECLIZINE HCL 12.5 MG/1
25 TABLET ORAL EVERY 6 HOURS SCHEDULED
Status: DISCONTINUED | OUTPATIENT
Start: 2024-06-08 | End: 2024-06-10 | Stop reason: HOSPADM

## 2024-06-08 RX ORDER — ASPIRIN 81 MG/1
324 TABLET, CHEWABLE ORAL ONCE
Status: COMPLETED | OUTPATIENT
Start: 2024-06-08 | End: 2024-06-08

## 2024-06-08 RX ORDER — NICOTINE POLACRILEX 2 MG
GUM BUCCAL DAILY
COMMUNITY

## 2024-06-08 RX ORDER — SODIUM CHLORIDE 0.9 % (FLUSH) 0.9 %
5-40 SYRINGE (ML) INJECTION PRN
Status: DISCONTINUED | OUTPATIENT
Start: 2024-06-08 | End: 2024-06-10 | Stop reason: HOSPADM

## 2024-06-08 RX ORDER — SODIUM CHLORIDE 9 MG/ML
INJECTION, SOLUTION INTRAVENOUS PRN
Status: DISCONTINUED | OUTPATIENT
Start: 2024-06-08 | End: 2024-06-10 | Stop reason: HOSPADM

## 2024-06-08 RX ORDER — MECLIZINE HCL 12.5 MG/1
12.5 TABLET ORAL ONCE
Status: COMPLETED | OUTPATIENT
Start: 2024-06-08 | End: 2024-06-08

## 2024-06-08 RX ORDER — PREDNISONE 20 MG/1
40 TABLET ORAL DAILY
Status: DISCONTINUED | OUTPATIENT
Start: 2024-06-11 | End: 2024-06-09

## 2024-06-08 RX ORDER — ONDANSETRON 4 MG/1
4 TABLET, ORALLY DISINTEGRATING ORAL EVERY 8 HOURS PRN
Status: DISCONTINUED | OUTPATIENT
Start: 2024-06-08 | End: 2024-06-10 | Stop reason: HOSPADM

## 2024-06-08 RX ORDER — ONDANSETRON 2 MG/ML
4 INJECTION INTRAMUSCULAR; INTRAVENOUS EVERY 6 HOURS PRN
Status: DISCONTINUED | OUTPATIENT
Start: 2024-06-08 | End: 2024-06-10 | Stop reason: HOSPADM

## 2024-06-08 RX ORDER — INSULIN LISPRO 100 [IU]/ML
0-16 INJECTION, SOLUTION INTRAVENOUS; SUBCUTANEOUS
Status: DISCONTINUED | OUTPATIENT
Start: 2024-06-08 | End: 2024-06-10 | Stop reason: HOSPADM

## 2024-06-08 RX ORDER — ACETAMINOPHEN 325 MG/1
650 TABLET ORAL EVERY 6 HOURS PRN
Status: DISCONTINUED | OUTPATIENT
Start: 2024-06-08 | End: 2024-06-10 | Stop reason: HOSPADM

## 2024-06-08 RX ORDER — SODIUM CHLORIDE 0.9 % (FLUSH) 0.9 %
5-40 SYRINGE (ML) INJECTION EVERY 12 HOURS SCHEDULED
Status: DISCONTINUED | OUTPATIENT
Start: 2024-06-08 | End: 2024-06-10 | Stop reason: HOSPADM

## 2024-06-08 RX ORDER — BUDESONIDE AND FORMOTEROL FUMARATE DIHYDRATE 160; 4.5 UG/1; UG/1
2 AEROSOL RESPIRATORY (INHALATION)
Status: DISCONTINUED | OUTPATIENT
Start: 2024-06-08 | End: 2024-06-10 | Stop reason: HOSPADM

## 2024-06-08 RX ORDER — ALBUTEROL SULFATE 2.5 MG/3ML
2.5 SOLUTION RESPIRATORY (INHALATION)
Status: DISCONTINUED | OUTPATIENT
Start: 2024-06-08 | End: 2024-06-10 | Stop reason: HOSPADM

## 2024-06-08 RX ORDER — ENOXAPARIN SODIUM 100 MG/ML
30 INJECTION SUBCUTANEOUS 2 TIMES DAILY
Status: DISCONTINUED | OUTPATIENT
Start: 2024-06-08 | End: 2024-06-10 | Stop reason: HOSPADM

## 2024-06-08 RX ORDER — ASPIRIN 325 MG
325 TABLET ORAL DAILY
Status: DISCONTINUED | OUTPATIENT
Start: 2024-06-08 | End: 2024-06-10 | Stop reason: HOSPADM

## 2024-06-08 RX ADMIN — WATER 125 MG: 1 INJECTION INTRAMUSCULAR; INTRAVENOUS; SUBCUTANEOUS at 17:19

## 2024-06-08 RX ADMIN — ASPIRIN 81 MG 324 MG: 81 TABLET ORAL at 12:33

## 2024-06-08 RX ADMIN — BUDESONIDE AND FORMOTEROL FUMARATE DIHYDRATE 2 PUFF: 160; 4.5 AEROSOL RESPIRATORY (INHALATION) at 21:16

## 2024-06-08 RX ADMIN — METHYLPREDNISOLONE SODIUM SUCCINATE 40 MG: 40 INJECTION INTRAMUSCULAR; INTRAVENOUS at 20:37

## 2024-06-08 RX ADMIN — MECLIZINE 12.5 MG: 12.5 TABLET ORAL at 12:32

## 2024-06-08 RX ADMIN — INSULIN LISPRO 8 UNITS: 100 INJECTION, SOLUTION INTRAVENOUS; SUBCUTANEOUS at 20:36

## 2024-06-08 RX ADMIN — SODIUM CHLORIDE, PRESERVATIVE FREE 10 ML: 5 INJECTION INTRAVENOUS at 20:37

## 2024-06-08 RX ADMIN — MECLIZINE 25 MG: 12.5 TABLET ORAL at 17:19

## 2024-06-08 RX ADMIN — ASPIRIN 325 MG: 325 TABLET ORAL at 22:09

## 2024-06-08 NOTE — ED PROVIDER NOTES
[facility]  Emergency Department  Emergency Medicine Resident Turn-Over     Note Started: 3:31 PM EDT    Care of Erick Valentine was assumed from Dr. Barnett and is being seen for Shoulder Pain and Dizziness  .  The patient's initial evaluation and plan have been discussed with the prior provider who initially evaluated the patient.     EMERGENCY DEPARTMENT COURSE / MEDICAL DECISION MAKING:       MEDICATIONS GIVEN:  Orders Placed This Encounter   Medications    meclizine (ANTIVERT) tablet 12.5 mg    aspirin chewable tablet 324 mg    albuterol sulfate HFA (PROVENTIL;VENTOLIN;PROAIR) 108 (90 Base) MCG/ACT inhaler 2 puff     Order Specific Question:   Initiate RT Bronchodilator Protocol     Answer:   Yes - Inpatient Protocol    methylPREDNISolone sodium succ (SOLU-MEDROL) 125 mg in sterile water 2 mL injection    aspirin tablet 325 mg    sodium chloride flush 0.9 % injection 5-40 mL    sodium chloride flush 0.9 % injection 5-40 mL    0.9 % sodium chloride infusion    OR Linked Order Group     ondansetron (ZOFRAN-ODT) disintegrating tablet 4 mg     ondansetron (ZOFRAN) injection 4 mg    polyethylene glycol (GLYCOLAX) packet 17 g    enoxaparin Sodium (LOVENOX) injection 30 mg     Order Specific Question:   Indication of Use     Answer:   Prophylaxis-DVT/PE    OR Linked Order Group     acetaminophen (TYLENOL) tablet 650 mg     acetaminophen (TYLENOL) suppository 650 mg    albuterol (PROVENTIL) (2.5 MG/3ML) 0.083% nebulizer solution 2.5 mg     Order Specific Question:   Initiate RT Bronchodilator Protocol     Answer:   Yes - Inpatient Protocol    FOLLOWED BY Linked Order Group     methylPREDNISolone sodium succ (SOLU-MEDROL) 40 mg in sterile water 1 mL injection     predniSONE (DELTASONE) tablet 40 mg    budesonide-formoterol (SYMBICORT) 160-4.5 MCG/ACT inhaler 2 puff    meclizine (ANTIVERT) tablet 25 mg    insulin lispro (HUMALOG,ADMELOG) injection vial 0-16 Units    insulin lispro (HUMALOG,ADMELOG)

## 2024-06-08 NOTE — ED NOTES
88% on room air. Placed back on 2L nasal cannula, increased to 94%  
O2 sat 88% on 2L while sleeping. Increased to 4L nc, increased to 93%  
Portable xray at bedside.   
Pt arrives to ED 20 via triage.  Pt co dizziness and L shoulder and neck pain.  Pt denies any pain at this time.  Pt states when he turns his head, he experiences pain in his neck and feels dizzy.  Pt states that he is experiencing dizziness at this time.  Pt denies any chest pain and SOB.   Pt respirations are even and unlabored, pt is alert and oriented X 4, speaking in complete sentences, bed is in the lowest position, call light is within reach, NAD noted.   Will continue to follow plan of care.     
Pt placed on 2L NC.   
The following labs were labeled with appropriate pt sticker and tubed to lab:     [x] Blue     [] Lavender   [] on ice  [] Green/yellow  [] Green/black [] on ice  [] Grey  [] on ice  [] Yellow  [] Red  [] Pink  [] Type/ Screen  [] ABG  [] VBG    [] COVID-19 swab    [] Rapid  [] PCR  [] Flu swab  [] Peds Viral Panel     [] Urine Sample  [] Fecal Sample  [] Pelvic Cultures  [] Blood Cultures  [] X 2  [] STREP Cultures  [] Wound Cultures    
meds    Unspecified vitamin D deficiency     Vasovagal episode     after coughing spell       Labs:  Labs Reviewed   BASIC METABOLIC PANEL - Abnormal; Notable for the following components:       Result Value    Sodium 134 (*)     Glucose 315 (*)     All other components within normal limits   CBC WITH AUTO DIFFERENTIAL   TROPONIN   TROPONIN   BRAIN NATRIURETIC PEPTIDE   D-DIMER, QUANTITATIVE       Electronically signed by Lupe Parish RN on 6/8/2024 at 4:19 PM

## 2024-06-08 NOTE — ED PROVIDER NOTES
[facility] ED  Emergency Department Encounter  Emergency Medicine Resident     Pt Name:Erick Valentine  MRN: [id number]  Birthdate 1960  Date of evaluation: 6/8/24  PCP:  Kennedy Cazares MD  Note Started: 2:08 PM EDT    CHIEF COMPLAINT       Chief Complaint   Patient presents with    Shoulder Pain    Dizziness     HISTORY OF PRESENT ILLNESS  (Location/Symptom, Timing/Onset, Context/Setting, Quality, Duration, Modifying Factors, Severity.)      Erick Valentine is a 63 y.o. male who presents with dizziness.  Patient states he has been having episodes of dizziness intermittently over the past month.  States it is worse when he moves his head or goes from sitting to standing too quickly.  Denies trauma.  No fevers or chills or recent illnesses.  No associated headache, nausea/vomiting, neck pain, back pain.  No chest pain or shortness of breath.  Patient states that he has a fibroid on his back that he can press and he can reproduce symptoms.    PAST MEDICAL / SURGICAL / SOCIAL / FAMILY HISTORY      has a past medical history of Acute bronchitis, Allergic rhinitis, cause unspecified, Backache, unspecified, Cervicalgia, Cough, Disorders of sacrum, Esophageal reflux, Obesity (BMI 30-39.9), Occlusion and stenosis of carotid artery without mention of cerebral infarction, Other abnormal glucose, Other and unspecified hyperlipidemia, Other specified forms of chronic ischemic heart disease, Pain in joint, pelvic region and thigh, PONV (postoperative nausea and vomiting), Ringing in the ears, Temporomandibular joint disorders, unspecified, Umbilical hernia, Unspecified essential hypertension, Unspecified vitamin D deficiency, and Vasovagal episode.     has a past surgical history that includes knee surgery (Left); Wrist surgery (Right); other surgical history (Bilateral); and Umbilical hernia repair (1 4 15).    Social History     Socioeconomic History    Marital status:      Spouse name:   Resp 14   Ht 1.778 m (5' 10\")   Wt 107.4 kg (236 lb 12.4 oz)   SpO2 94%   BMI 33.97 kg/m²   I have reviewed the triage vital signs.  Const: Well nourished, well developed, appears stated age, no acute distress, nontoxic in appearance  Eyes: PERRL, EOMI, no conjunctival injection.  Fatigable left-sided horizontal nystagmus  HENT: NCAT, Neck supple without meningismus.  No midline C-spine tenderness.  CV: RRR, Warm, well-perfused extremities.  +2/4 radial pulses bilaterally.  RESP: CTAB, Unlabored respiratory effort  GI: soft, non-tender, non-distended, no masses  Neuro: Alert and oriented x 4, GCS 15. CNs II-XII grossly intact. Sensation and motor function of extremities grossly intact.  Psych: Appropriate mood and affect.      DDX/DIAGNOSTIC RESULTS / EMERGENCY DEPARTMENT COURSE / MDM     Medical Decision Making  63-year-old male with history of diabetes who is not on any medications presents emergency department with intermittent episodes of dizziness for the past month.  Denies any other symptoms.  No fevers or chills or recent illnesses.  Denies trauma.  Not on blood thinners.  Patient does note that there is a area on his thoracic back just medial to the left scapula that can be palpated and reproduce symptoms, however this was unable to be reproduced in the emergency department.  Patient resting comfortably, no acute distress.  Vital signs stable.  Upon chart review it does appear the patient was recently evaluated for similar complaints and received CT imaging at that time.  Patient states that symptoms are unchanged from that presentation.  No need for further imaging at this time.  Concern for vertigo, arrhythmia, electrolyte abnormality, ACS/MI.  Will get lab work and imaging.  Will treat symptoms.  Will reevaluate.    Amount and/or Complexity of Data Reviewed  Labs: ordered. Decision-making details documented in ED Course.  Radiology: ordered. Decision-making details documented in ED

## 2024-06-08 NOTE — H&P
[facility]  Office: [phone]  Jesus Andino DO, Floyd Sanchez DO, Quinn Cagle DO, Rogelio Abel DO, Vamshi Boateng MD, Nicole Adler MD, Renita Cornejo MD, Jacqui Irwin MD,  Quinton Siomn MD, Williams Perla MD, Suzette Doe MD,  Ashleigh Johnson DO, Greg Bhatia MD, Misael Dowling MD, Jony Andino DO, Kavitha Beltran MD,  Marvin Ramirez DO, Eden Luciano MD, Connie Wilson MD, Yadi Ibarra MD, Ash Briggs MD,  Miguel A Valladares MD, Gene Reddy MD, Soraida Snyder MD, Edumnd Varghese MD, Olaf Wright MD, William Wolff MD, William Carlton DO, Ponce Henriquez DO, Natividad Oreilly MD,  Charli Naik MD, Shirley Waterhouse, CNP,  Destiny Mittal CNP, Chandler Conway, CNP,  Esperanza Merino, DNP, Xin Pedraza, CNP, Dalia Alvarenga, CNP, Mecca Brar, CNP, Giulia Walsh, CNP, Kimberly Arias, PA-C, Ally Garg PA-C, Yaneth Mckenna, CNP, Shari Cornejo, CNP, Sophie Olivarez, CNP, Cristina Kauffman, CNP, Eliana Nuñez, CNP, Beryl Varghese, CNS, Snow Pereira, CNP, Jodie Burgess CNP, Tracy Schwab, CNP         [facility]   IN-PATIENT SERVICE   [facility]    HISTORY AND PHYSICAL EXAMINATION            Date:   6/8/2024  Patient name:  Erick Valentine  Date of admission:  6/8/2024 11:54 AM  MRN:   [id number]  Account:  [id number]  YOB: 1960  PCP:    Kennedy Cazares MD  Room:   37/  Code Status:    No Order      History Obtained From:     patient, electronic medical record    History of Present Illness:     Erick Valentine is a 63 y.o. Non- / non  male who presents with Shoulder Pain and Dizziness   and is admitted to the hospital for the management of Acute hypoxic respiratory failure (HCC).    This very pleasant 63-year-old male presents to the hospital with dizziness.  He states that he was leaning over to get cat litter when he became acutely dizzy.  He notes that it feels that he is falling to the left.  He has had  on exposed skin area  Extremities:  peripheral pulses palpable, no pedal edema or calf pain with palpation  Psych: normal affect     Investigations:      Laboratory Testing:  Recent Results (from the past 24 hour(s))   CBC with Auto Differential    Collection Time: 06/08/24 12:30 PM   Result Value Ref Range    WBC 7.2 3.5 - 11.3 k/uL    RBC 5.59 4.21 - 5.77 m/uL    Hemoglobin 16.5 13.0 - 17.0 g/dL    Hematocrit 49.2 40.7 - 50.3 %    MCV 88.0 82.6 - 102.9 fL    MCH 29.5 25.2 - 33.5 pg    MCHC 33.5 28.4 - 34.8 g/dL    RDW 12.8 11.8 - 14.4 %    Platelets 238 138 - 453 k/uL    MPV 9.8 8.1 - 13.5 fL    NRBC Automated 0.0 0.0 per 100 WBC    Neutrophils % 49 36 - 65 %    Lymphocytes % 38 24 - 43 %    Monocytes % 10 3 - 12 %    Eosinophils % 2 1 - 4 %    Basophils % 1 0 - 2 %    Immature Granulocytes % 0 0 %    Neutrophils Absolute 3.55 1.50 - 8.10 k/uL    Lymphocytes Absolute 2.74 1.10 - 3.70 k/uL    Monocytes Absolute 0.73 0.10 - 1.20 k/uL    Eosinophils Absolute 0.14 0.00 - 0.44 k/uL    Basophils Absolute 0.04 0.00 - 0.20 k/uL    Immature Granulocytes Absolute <0.03 0.00 - 0.30 k/uL   Basic Metabolic Panel    Collection Time: 06/08/24 12:30 PM   Result Value Ref Range    Sodium 134 (L) 136 - 145 mmol/L    Potassium 3.9 3.7 - 5.3 mmol/L    Chloride 98 98 - 107 mmol/L    CO2 22 20 - 31 mmol/L    Anion Gap 14 9 - 16 mmol/L    Glucose 315 (H) 74 - 99 mg/dL    BUN 17 8 - 23 mg/dL    Creatinine 0.8 0.70 - 1.20 mg/dL    Est, Glom Filt Rate >90 >60 mL/min/1.73m2    Calcium 9.2 8.6 - 10.4 mg/dL   Troponin    Collection Time: 06/08/24 12:30 PM   Result Value Ref Range    Troponin, High Sensitivity 8 0 - 22 ng/L   Brain Natriuretic Peptide    Collection Time: 06/08/24 12:30 PM   Result Value Ref Range    Pro-BNP <36 0 - 300 pg/mL   Troponin    Collection Time: 06/08/24  1:30 PM   Result Value Ref Range    Troponin, High Sensitivity 7 0 - 22 ng/L   D-Dimer, Quantitative    Collection Time: 06/08/24  3:25 PM   Result Value Ref Range

## 2024-06-08 NOTE — ED PROVIDER NOTES
[facility]  Emergency Department  Faculty Attestation     I performed a history and physical examination of the patient and discussed management with the resident. I reviewed the resident’s note and agree with the documented findings and plan of care. Any areas of disagreement are noted on the chart. I was personally present for the key portions of any procedures. I have documented in the chart those procedures where I was not present during the key portions. I have reviewed the emergency nurses triage note. I agree with the chief complaint, past medical history, past surgical history, allergies, medications, social and family history as documented unless otherwise noted below.    For Physician Assistant/ Nurse Practitioner cases/documentation I have personally evaluated this patient and have completed at least one if not all key elements of the E/M (history, physical exam, and MDM). Additional findings are as noted.    Preliminary note started at 12:05 PM EDT    Primary Care Physician:  Kennedy Cazares MD    Screenings:  [unfilled]    CHIEF COMPLAINT       Chief Complaint   Patient presents with    Shoulder Pain    Dizziness       RECENT VITALS:   BP (!) 177/104   Pulse 63   Temp 98 °F (36.7 °C) (Oral)   Resp 14   Ht 1.778 m (5' 10\")   Wt 107.4 kg (236 lb 12.4 oz)   BMI 33.97 kg/m²     LABS:  Labs Reviewed - No data to display    Radiology  No orders to display       CRITICAL CARE: There was a high probability of clinically significant/life threatening deterioration in this patient's condition which required my urgent intervention.  Total critical care time was none minutes.  This excludes any time for separately reportable procedures.     EKG:  EKG Interpretation    Interpreted by me    Rhythm: normal sinus   Rate: normal  Axis: Left  Ectopy: none  Conduction: normal  ST Segments: no acute change  T Waves: no acute change  Q Waves: none    Clinical Impression: Left

## 2024-06-09 ENCOUNTER — APPOINTMENT (OUTPATIENT)
Dept: GENERAL RADIOLOGY | Age: 64
DRG: 190 | End: 2024-06-09
Payer: MEDICARE

## 2024-06-09 PROBLEM — J44.1 COPD EXACERBATION (HCC): Status: ACTIVE | Noted: 2024-06-09

## 2024-06-09 LAB
ANION GAP SERPL CALCULATED.3IONS-SCNC: 22 MMOL/L (ref 9–16)
BASOPHILS # BLD: <0.03 K/UL (ref 0–0.2)
BASOPHILS NFR BLD: 0 % (ref 0–2)
BUN SERPL-MCNC: 24 MG/DL (ref 8–23)
CALCIUM SERPL-MCNC: 9.2 MG/DL (ref 8.6–10.4)
CHLORIDE SERPL-SCNC: 94 MMOL/L (ref 98–107)
CO2 SERPL-SCNC: 13 MMOL/L (ref 20–31)
CREAT SERPL-MCNC: 1.4 MG/DL (ref 0.7–1.2)
EOSINOPHIL # BLD: <0.03 K/UL (ref 0–0.44)
EOSINOPHILS RELATIVE PERCENT: 0 % (ref 1–4)
ERYTHROCYTE [DISTWIDTH] IN BLOOD BY AUTOMATED COUNT: 13.1 % (ref 11.8–14.4)
GFR, ESTIMATED: 55 ML/MIN/1.73M2
GLUCOSE BLD-MCNC: 236 MG/DL (ref 75–110)
GLUCOSE BLD-MCNC: 331 MG/DL (ref 75–110)
GLUCOSE BLD-MCNC: 337 MG/DL (ref 75–110)
GLUCOSE BLD-MCNC: 364 MG/DL (ref 75–110)
GLUCOSE BLD-MCNC: 484 MG/DL (ref 75–110)
GLUCOSE BLD-MCNC: 493 MG/DL (ref 75–110)
GLUCOSE SERPL-MCNC: 543 MG/DL (ref 74–99)
HCT VFR BLD AUTO: 49.7 % (ref 40.7–50.3)
HGB BLD-MCNC: 16.1 G/DL (ref 13–17)
IMM GRANULOCYTES # BLD AUTO: 0.09 K/UL (ref 0–0.3)
IMM GRANULOCYTES NFR BLD: 1 %
LYMPHOCYTES NFR BLD: 1.19 K/UL (ref 1.1–3.7)
LYMPHOCYTES RELATIVE PERCENT: 9 % (ref 24–43)
MCH RBC QN AUTO: 29.8 PG (ref 25.2–33.5)
MCHC RBC AUTO-ENTMCNC: 32.4 G/DL (ref 28.4–34.8)
MCV RBC AUTO: 92 FL (ref 82.6–102.9)
MONOCYTES NFR BLD: 0.29 K/UL (ref 0.1–1.2)
MONOCYTES NFR BLD: 2 % (ref 3–12)
NEUTROPHILS NFR BLD: 88 % (ref 36–65)
NEUTS SEG NFR BLD: 11.65 K/UL (ref 1.5–8.1)
NRBC BLD-RTO: 0 PER 100 WBC
PLATELET # BLD AUTO: 293 K/UL (ref 138–453)
PMV BLD AUTO: 10.4 FL (ref 8.1–13.5)
POTASSIUM SERPL-SCNC: 3.9 MMOL/L (ref 3.7–5.3)
RBC # BLD AUTO: 5.4 M/UL (ref 4.21–5.77)
SODIUM SERPL-SCNC: 129 MMOL/L (ref 136–145)
WBC OTHER # BLD: 13.2 K/UL (ref 3.5–11.3)

## 2024-06-09 PROCEDURE — 6360000002 HC RX W HCPCS: Performed by: HOSPITALIST

## 2024-06-09 PROCEDURE — G0378 HOSPITAL OBSERVATION PER HR: HCPCS

## 2024-06-09 PROCEDURE — 36415 COLL VENOUS BLD VENIPUNCTURE: CPT

## 2024-06-09 PROCEDURE — 6370000000 HC RX 637 (ALT 250 FOR IP): Performed by: HOSPITALIST

## 2024-06-09 PROCEDURE — 96372 THER/PROPH/DIAG INJ SC/IM: CPT

## 2024-06-09 PROCEDURE — 6370000000 HC RX 637 (ALT 250 FOR IP): Performed by: NURSE PRACTITIONER

## 2024-06-09 PROCEDURE — 85025 COMPLETE CBC W/AUTO DIFF WBC: CPT

## 2024-06-09 PROCEDURE — 99232 SBSQ HOSP IP/OBS MODERATE 35: CPT | Performed by: NURSE PRACTITIONER

## 2024-06-09 PROCEDURE — 96376 TX/PRO/DX INJ SAME DRUG ADON: CPT

## 2024-06-09 PROCEDURE — 2700000000 HC OXYGEN THERAPY PER DAY

## 2024-06-09 PROCEDURE — 1200000000 HC SEMI PRIVATE

## 2024-06-09 PROCEDURE — 80048 BASIC METABOLIC PNL TOTAL CA: CPT

## 2024-06-09 PROCEDURE — 2580000003 HC RX 258: Performed by: HOSPITALIST

## 2024-06-09 PROCEDURE — 82947 ASSAY GLUCOSE BLOOD QUANT: CPT

## 2024-06-09 PROCEDURE — 94640 AIRWAY INHALATION TREATMENT: CPT

## 2024-06-09 PROCEDURE — 72040 X-RAY EXAM NECK SPINE 2-3 VW: CPT

## 2024-06-09 RX ORDER — PREDNISONE 20 MG/1
40 TABLET ORAL DAILY
Status: DISCONTINUED | OUTPATIENT
Start: 2024-06-10 | End: 2024-06-10 | Stop reason: HOSPADM

## 2024-06-09 RX ORDER — TIZANIDINE 4 MG/1
4 TABLET ORAL EVERY 6 HOURS PRN
Status: DISCONTINUED | OUTPATIENT
Start: 2024-06-09 | End: 2024-06-10 | Stop reason: HOSPADM

## 2024-06-09 RX ORDER — CETIRIZINE HYDROCHLORIDE 10 MG/1
10 TABLET ORAL DAILY
Status: DISCONTINUED | OUTPATIENT
Start: 2024-06-09 | End: 2024-06-10 | Stop reason: HOSPADM

## 2024-06-09 RX ORDER — INSULIN LISPRO 100 [IU]/ML
10 INJECTION, SOLUTION INTRAVENOUS; SUBCUTANEOUS ONCE
Status: COMPLETED | OUTPATIENT
Start: 2024-06-09 | End: 2024-06-09

## 2024-06-09 RX ORDER — INSULIN LISPRO 100 [IU]/ML
20 INJECTION, SOLUTION INTRAVENOUS; SUBCUTANEOUS ONCE
Status: COMPLETED | OUTPATIENT
Start: 2024-06-09 | End: 2024-06-09

## 2024-06-09 RX ORDER — AZITHROMYCIN 250 MG/1
500 TABLET, FILM COATED ORAL DAILY
Status: DISCONTINUED | OUTPATIENT
Start: 2024-06-09 | End: 2024-06-10 | Stop reason: HOSPADM

## 2024-06-09 RX ADMIN — METHYLPREDNISOLONE SODIUM SUCCINATE 40 MG: 40 INJECTION INTRAMUSCULAR; INTRAVENOUS at 02:04

## 2024-06-09 RX ADMIN — INSULIN LISPRO 16 UNITS: 100 INJECTION, SOLUTION INTRAVENOUS; SUBCUTANEOUS at 13:20

## 2024-06-09 RX ADMIN — ENOXAPARIN SODIUM 30 MG: 100 INJECTION SUBCUTANEOUS at 20:52

## 2024-06-09 RX ADMIN — MECLIZINE 25 MG: 12.5 TABLET ORAL at 08:46

## 2024-06-09 RX ADMIN — INSULIN LISPRO 20 UNITS: 100 INJECTION, SOLUTION INTRAVENOUS; SUBCUTANEOUS at 14:46

## 2024-06-09 RX ADMIN — MECLIZINE 25 MG: 12.5 TABLET ORAL at 02:03

## 2024-06-09 RX ADMIN — BUDESONIDE AND FORMOTEROL FUMARATE DIHYDRATE 2 PUFF: 160; 4.5 AEROSOL RESPIRATORY (INHALATION) at 09:39

## 2024-06-09 RX ADMIN — ASPIRIN 325 MG: 325 TABLET ORAL at 08:45

## 2024-06-09 RX ADMIN — SODIUM CHLORIDE, PRESERVATIVE FREE 10 ML: 5 INJECTION INTRAVENOUS at 20:52

## 2024-06-09 RX ADMIN — BUDESONIDE AND FORMOTEROL FUMARATE DIHYDRATE 2 PUFF: 160; 4.5 AEROSOL RESPIRATORY (INHALATION) at 19:40

## 2024-06-09 RX ADMIN — METHYLPREDNISOLONE SODIUM SUCCINATE 40 MG: 40 INJECTION INTRAMUSCULAR; INTRAVENOUS at 08:45

## 2024-06-09 RX ADMIN — INSULIN LISPRO 4 UNITS: 100 INJECTION, SOLUTION INTRAVENOUS; SUBCUTANEOUS at 20:52

## 2024-06-09 RX ADMIN — INSULIN LISPRO 16 UNITS: 100 INJECTION, SOLUTION INTRAVENOUS; SUBCUTANEOUS at 08:46

## 2024-06-09 RX ADMIN — SODIUM CHLORIDE, PRESERVATIVE FREE 10 ML: 5 INJECTION INTRAVENOUS at 08:54

## 2024-06-09 RX ADMIN — MECLIZINE 25 MG: 12.5 TABLET ORAL at 13:21

## 2024-06-09 RX ADMIN — INSULIN LISPRO 10 UNITS: 100 INJECTION, SOLUTION INTRAVENOUS; SUBCUTANEOUS at 13:28

## 2024-06-09 RX ADMIN — AZITHROMYCIN 500 MG: 250 TABLET, FILM COATED ORAL at 13:21

## 2024-06-09 RX ADMIN — MECLIZINE 25 MG: 12.5 TABLET ORAL at 20:52

## 2024-06-09 RX ADMIN — ENOXAPARIN SODIUM 30 MG: 100 INJECTION SUBCUTANEOUS at 08:45

## 2024-06-09 RX ADMIN — INSULIN LISPRO 4 UNITS: 100 INJECTION, SOLUTION INTRAVENOUS; SUBCUTANEOUS at 17:02

## 2024-06-09 RX ADMIN — CETIRIZINE HYDROCHLORIDE 10 MG: 10 TABLET ORAL at 13:21

## 2024-06-09 NOTE — CARE COORDINATION
Case Management Assessment  Initial Evaluation    Date/Time of Evaluation: 6/9/2024 5:23 PM  Assessment Completed by: Vicenta Hopkins RN    If patient is discharged prior to next notation, then this note serves as note for discharge by case management.    Patient Name: Erick Valentine                   YOB: 1960  Diagnosis: Acute hypoxic respiratory failure (HCC) [J96.01]                   Date / Time: 6/8/2024 11:54 AM    Patient Admission Status: Inpatient   Readmission Risk (Low < 19, Mod (19-27), High > 27): Readmission Risk Score: 13.3    Current PCP: Kennedy Cazares MD  PCP verified by CM? Yes    Chart Reviewed: Yes      History Provided by: Patient  Patient Orientation: Alert and Oriented    Patient Cognition: Alert    Hospitalization in the last 30 days (Readmission):  No    If yes, Readmission Assessment in  Navigator will be completed.    Advance Directives:      Code Status: Full Code   Patient's Primary Decision Maker is: Legal Next of Kin      Discharge Planning:    Patient lives with: Children Type of Home: House  Primary Care Giver: Self  Patient Support Systems include: Children   Current Financial resources: Medicare  Current community resources:    Current services prior to admission: None            Current DME:              Type of Home Care services:  None    ADLS  Prior functional level: Independent in ADLs/IADLs  Current functional level: Independent in ADLs/IADLs    PT AM-PAC:   /24  OT AM-PAC:   /24    Family can provide assistance at DC: Yes  Would you like Case Management to discuss the discharge plan with any other family members/significant others, and if so, who? Yes (daughter- Rabia)  Plans to Return to Present Housing: Yes  Other Identified Issues/Barriers to RETURNING to current housing: n/a  Potential Assistance needed at discharge: N/A            Potential DME:    Patient expects to discharge to: House  Plan for transportation at discharge:

## 2024-06-09 NOTE — PLAN OF CARE
Problem: Chronic Conditions and Co-morbidities  Goal: Patient's chronic conditions and co-morbidity symptoms are monitored and maintained or improved  6/9/2024 1744 by Qing Grimm, RN  Outcome: Progressing  6/9/2024 0605 by Jannet Kearney, RN  Outcome: Progressing

## 2024-06-10 VITALS
HEIGHT: 70 IN | OXYGEN SATURATION: 96 % | DIASTOLIC BLOOD PRESSURE: 77 MMHG | SYSTOLIC BLOOD PRESSURE: 134 MMHG | TEMPERATURE: 98.1 F | WEIGHT: 236.77 LBS | BODY MASS INDEX: 33.9 KG/M2 | RESPIRATION RATE: 16 BRPM | HEART RATE: 58 BPM

## 2024-06-10 LAB
ANION GAP SERPL CALCULATED.3IONS-SCNC: 11 MMOL/L (ref 9–16)
BUN SERPL-MCNC: 29 MG/DL (ref 8–23)
CALCIUM SERPL-MCNC: 8.8 MG/DL (ref 8.6–10.4)
CHLORIDE SERPL-SCNC: 104 MMOL/L (ref 98–107)
CO2 SERPL-SCNC: 22 MMOL/L (ref 20–31)
CREAT SERPL-MCNC: 0.9 MG/DL (ref 0.7–1.2)
EKG ATRIAL RATE: 69 BPM
EKG P-R INTERVAL: 152 MS
EKG Q-T INTERVAL: 428 MS
EKG QRS DURATION: 100 MS
EKG QTC CALCULATION (BAZETT): 458 MS
EKG R AXIS: -55 DEGREES
EKG T AXIS: -47 DEGREES
EKG VENTRICULAR RATE: 69 BPM
ERYTHROCYTE [DISTWIDTH] IN BLOOD BY AUTOMATED COUNT: 13.2 % (ref 11.8–14.4)
GFR, ESTIMATED: >90 ML/MIN/1.73M2
GLUCOSE BLD-MCNC: 336 MG/DL (ref 75–110)
GLUCOSE BLD-MCNC: 357 MG/DL (ref 75–110)
GLUCOSE SERPL-MCNC: 341 MG/DL (ref 74–99)
HCT VFR BLD AUTO: 44.9 % (ref 40.7–50.3)
HGB BLD-MCNC: 14.5 G/DL (ref 13–17)
MCH RBC QN AUTO: 29.7 PG (ref 25.2–33.5)
MCHC RBC AUTO-ENTMCNC: 32.3 G/DL (ref 28.4–34.8)
MCV RBC AUTO: 92 FL (ref 82.6–102.9)
NRBC BLD-RTO: 0 PER 100 WBC
PLATELET # BLD AUTO: 204 K/UL (ref 138–453)
PMV BLD AUTO: 10.4 FL (ref 8.1–13.5)
POTASSIUM SERPL-SCNC: 4.1 MMOL/L (ref 3.7–5.3)
RBC # BLD AUTO: 4.88 M/UL (ref 4.21–5.77)
SODIUM SERPL-SCNC: 137 MMOL/L (ref 136–145)
WBC OTHER # BLD: 14.6 K/UL (ref 3.5–11.3)

## 2024-06-10 PROCEDURE — 2580000003 HC RX 258: Performed by: HOSPITALIST

## 2024-06-10 PROCEDURE — 82947 ASSAY GLUCOSE BLOOD QUANT: CPT

## 2024-06-10 PROCEDURE — 85027 COMPLETE CBC AUTOMATED: CPT

## 2024-06-10 PROCEDURE — 6370000000 HC RX 637 (ALT 250 FOR IP): Performed by: NURSE PRACTITIONER

## 2024-06-10 PROCEDURE — 80048 BASIC METABOLIC PNL TOTAL CA: CPT

## 2024-06-10 PROCEDURE — 99232 SBSQ HOSP IP/OBS MODERATE 35: CPT | Performed by: NURSE PRACTITIONER

## 2024-06-10 PROCEDURE — 96372 THER/PROPH/DIAG INJ SC/IM: CPT

## 2024-06-10 PROCEDURE — G0108 DIAB MANAGE TRN  PER INDIV: HCPCS

## 2024-06-10 PROCEDURE — 6370000000 HC RX 637 (ALT 250 FOR IP): Performed by: HOSPITALIST

## 2024-06-10 PROCEDURE — 6360000002 HC RX W HCPCS: Performed by: HOSPITALIST

## 2024-06-10 PROCEDURE — G0378 HOSPITAL OBSERVATION PER HR: HCPCS

## 2024-06-10 PROCEDURE — 36415 COLL VENOUS BLD VENIPUNCTURE: CPT

## 2024-06-10 PROCEDURE — 94640 AIRWAY INHALATION TREATMENT: CPT

## 2024-06-10 PROCEDURE — 94761 N-INVAS EAR/PLS OXIMETRY MLT: CPT

## 2024-06-10 RX ORDER — INSULIN GLARGINE 100 [IU]/ML
10 INJECTION, SOLUTION SUBCUTANEOUS NIGHTLY
Status: DISCONTINUED | OUTPATIENT
Start: 2024-06-10 | End: 2024-06-10 | Stop reason: HOSPADM

## 2024-06-10 RX ADMIN — ENOXAPARIN SODIUM 30 MG: 100 INJECTION SUBCUTANEOUS at 08:28

## 2024-06-10 RX ADMIN — AZITHROMYCIN 500 MG: 250 TABLET, FILM COATED ORAL at 08:28

## 2024-06-10 RX ADMIN — BUDESONIDE AND FORMOTEROL FUMARATE DIHYDRATE 2 PUFF: 160; 4.5 AEROSOL RESPIRATORY (INHALATION) at 09:20

## 2024-06-10 RX ADMIN — MECLIZINE 25 MG: 12.5 TABLET ORAL at 08:28

## 2024-06-10 RX ADMIN — INSULIN LISPRO 12 UNITS: 100 INJECTION, SOLUTION INTRAVENOUS; SUBCUTANEOUS at 12:41

## 2024-06-10 RX ADMIN — SODIUM CHLORIDE, PRESERVATIVE FREE 10 ML: 5 INJECTION INTRAVENOUS at 08:32

## 2024-06-10 RX ADMIN — ASPIRIN 325 MG: 325 TABLET ORAL at 09:03

## 2024-06-10 RX ADMIN — MECLIZINE 25 MG: 12.5 TABLET ORAL at 00:13

## 2024-06-10 RX ADMIN — PREDNISONE 40 MG: 20 TABLET ORAL at 08:28

## 2024-06-10 RX ADMIN — MECLIZINE 25 MG: 12.5 TABLET ORAL at 14:10

## 2024-06-10 RX ADMIN — INSULIN LISPRO 12 UNITS: 100 INJECTION, SOLUTION INTRAVENOUS; SUBCUTANEOUS at 09:03

## 2024-06-10 RX ADMIN — CETIRIZINE HYDROCHLORIDE 10 MG: 10 TABLET ORAL at 08:28

## 2024-06-10 NOTE — PLAN OF CARE
Problem: Chronic Conditions and Co-morbidities  Goal: Patient's chronic conditions and co-morbidity symptoms are monitored and maintained or improved  6/10/2024 1850 by Debbie Juarez RN  Outcome: Completed  6/10/2024 0615 by Estevan Finch, RN  Outcome: Progressing     Problem: Safety - Adult  Goal: Free from fall injury  6/10/2024 1850 by Debbie Juarez, RN  Outcome: Completed  6/10/2024 0615 by Estevan Finch, RN  Outcome: Progressing

## 2024-06-10 NOTE — PROGRESS NOTES
Erick Valentine,  Seen for evaluation and education on Type 2 uncontrolled. Referral states: A1c 12.9. Refusing insulin for home.    Lab Results   Component Value Date    LABA1C 12.9 05/28/2024     Lab Results   Component Value Date     01/13/2022       Discussed with Erick DIONNA Valentine, their current diabetes self care routines prior to this admission. medication compliance: prescribed amaryl and glucophage but wasn't taking it. Prefers natural remedies.  diabetic diet compliance: noncompliant much of the time was drinking regular beverages. Admits to needing and wanting to lose weight, hasn't worked with a dietitian before, home glucose monitoring: is not performed. Pt states he has a meter but hasn't used, is interested in CGM and plans to discuss with PCP. Talked with pt re: how family hx increased his chances of developing diabetes and how it progresses over time. Has at least a 4 yr h/o DM - Dr. Prescribed meds but voiced he didn't take, nor check his bs. Relied on testimonials of people with dm and products use. Recently he had symptoms of increased thirst and urination, developed an allergic rxn to sumac neighbor was burning. Currently, being treated with steroids for that and respiratory failure. Discussed need for DM medication stronger than natural supplements. Discussed how amaryl and glucophage work and need for insulin to work quicker w getting bs down. Agreed to being treated w rapid insulin ac meals in the hospital. Explained action of long acting vs short acting insulin. Since pt is new to taking insulin would encourage once daily long acting insulin along with oral meds and fu with PCP. Agreeable to once daily long acting insulin.    Briefly discussed role of diet, physical activity, daily use of medications and self monitoring for good diabetes control, provided diabetes education folder.  Also referred patient to view diabetes education programs on education TV - Channel 75 and 76 at 9am, 
[facility]  Office: [phone]  Jesus Andino DO, Floyd Sanchez, DO, Quinn Cagle DO, Rogelio Abel DO, Vamshi Boateng MD, Nicole Adler MD, Renita Cornejo MD, Jacqui Irwin MD,  Quinton Simon MD, Williams Perla MD, Suzette Doe MD,  Ashleigh Johnson DO, Greg Bhatia MD, Misael Dowling MD, Jony Andino DO, Kavitha Beltran MD,  Marvin Ramirez DO, Eden Luciano MD, Connie Wilson MD, Yadi Ibarra MD, Ash Briggs MD,  Miguel A Valladares MD, Gene Reddy MD, Soraida Snyder MD, Edmund Varghese MD, Olaf Wright MD, William Wolff MD, William Carlton DO, Ponce Henriquez DO, Natividad Oreilly MD,  Charli Naik MD, Shirley Waterhouse, CNP,  Destiny Mittal CNP, Chandler Conway, CNP,  Esperanza Merino, DNP, Xin Pedraza, CNP, Dalia Alvarenga, CNP, Mecca Brar CNP, Giulia Walsh, CNP, Kimberly Arias, PA-C, Ally Garg PA-C, Yaneth Mckenna, CNP, Shari Cornejo, CNP, Sophie Olivarez, CNP, Cristina Kauffman, CNP, Eliana Nuñez, CNP, Beryl Varghese, CNS, Snow Pereira, CNP, Jodie Burgess CNP, Tracy Schwab, CNP         [facility]   IN-PATIENT SERVICE   [facility]    Progress Note    6/10/2024    7:54 AM    Name:   Erick Valentine  MRN:     [id number]     Acct:      [id number]   Room:   0341/0341-01   Day:  2  Admit Date:  6/8/2024 11:54 AM    PCP:   Kennedy Cazares MD  Code Status:  Full Code    Subjective:     C/C:   Chief Complaint   Patient presents with    Shoulder Pain    Dizziness     Interval History Status: improved.     Seen and examined at bedside. Up in room without complaints or apparent distress. On room air oxygenation. VS reviewed and stable. Afebrile. Labs reviewed.  Continues to complain of chronic vertigo and states he has a single point on his neck that causes dizziness. Reviewed xrays of neck obtained. Encouraged muscle relaxer and gentle stretching as well as increased blood sugar surveillance at home. Follow up care with PCP.     Brief 
Writer entered room to administer ordered insulin. Room empty and all belongings removed.  
Unspecified vitamin D deficiency, and Vasovagal episode.    Social History:   reports that he quit smoking about 36 years ago. His smoking use included cigarettes. He has never used smokeless tobacco. He reports current alcohol use. He reports current drug use. Drug: Marijuana (Weed).     Family History:   Family History   Problem Relation Age of Onset    Seizures Father         epilepsy    COPD Mother        Vitals:  /83   Pulse 82   Temp 97.5 °F (36.4 °C) (Oral)   Resp 16   Ht 1.778 m (5' 10\")   Wt 107.4 kg (236 lb 12.4 oz)   SpO2 96%   BMI 33.97 kg/m²   Temp (24hrs), Av.9 °F (36.6 °C), Min:97.5 °F (36.4 °C), Max:98.5 °F (36.9 °C)    Recent Labs     24  0736   POCGLU 281* 364*       I/O (24Hr):    Intake/Output Summary (Last 24 hours) at 2024 0851  Last data filed at 2024 0209  Gross per 24 hour   Intake --   Output 1200 ml   Net -1200 ml       Labs:  Hematology:  Recent Labs     24  1525   WBC 7.2  --    RBC 5.59  --    HGB 16.5  --    HCT 49.2  --    MCV 88.0  --    MCH 29.5  --    MCHC 33.5  --    RDW 12.8  --      --    MPV 9.8  --    DDIMER  --  <0.27     Chemistry:  Recent Labs     24  1330   *  --    K 3.9  --    CL 98  --    CO2 22  --    GLUCOSE 315*  --    BUN 17  --    CREATININE 0.8  --    ANIONGAP 14  --    LABGLOM >90  --    CALCIUM 9.2  --    PROBNP <36  --    TROPHS 8 7     Recent Labs     24  0736   POCGLU 281* 364*     ABG:No results found for: \"POCPH\", \"PHART\", \"PH\", \"POCPCO2\", \"DQV2JND\", \"PCO2\", \"POCPO2\", \"PO2ART\", \"PO2\", \"POCHCO3\", \"RAT3DRL\", \"HCO3\", \"NBEA\", \"PBEA\", \"BEART\", \"BE\", \"THGBART\", \"THB\", \"IIE7EPT\", \"EMAD7YWN\", \"W4KPNDJT\", \"O2SAT\", \"FIO2\"  No results found for: \"SPECIAL\"  No results found for: \"CULTURE\"    Radiology:  XR CHEST PORTABLE    Result Date: 2024  1. No acute cardiopulmonary process. 2. Tortuous thoracic aorta.       Physical Examination:

## 2024-06-10 NOTE — PLAN OF CARE
Problem: Chronic Conditions and Co-morbidities  Goal: Patient's chronic conditions and co-morbidity symptoms are monitored and maintained or improved  6/10/2024 0615 by Estevan Finch, RN  Outcome: Progressing  6/9/2024 1744 by Qing Grimm, RN  Outcome: Progressing     Problem: Safety - Adult  Goal: Free from fall injury  Outcome: Progressing

## 2024-06-11 ENCOUNTER — CARE COORDINATION (OUTPATIENT)
Dept: CARE COORDINATION | Age: 64
End: 2024-06-11

## 2024-06-11 NOTE — CARE COORDINATION
Care Transitions Note  Initial Call - Call within 2 business days of discharge: Yes    Attempted to reach patient for transitions of care follow up. Unable to reach patient.    Outreach Attempts:   HIPAA compliant voicemail left for patient.     Patient: Erick Valentine    Patient : 1960   MRN: [id number]    Reason for Admission: COPD exacerbation, Vertigo  Discharge Date: 6/10/24  RURS: Readmission Risk Score: 12.3    Last Discharge Facility       Date Complaint Diagnosis Description Type Department Provider    24 Shoulder Pain; Dizziness COPD exacerbation (HCC) ED to Hosp-Admission (Discharged) (ADMITTED) [facility] Jacqui Irwin MD; Azeem Marshall...            Was this an external facility discharge? No    Follow Up Appointment:   Patient has hospital follow up appointment scheduled  Needs HFU     Future Appointments         Provider Specialty Dept Phone    2024 10:30 AM Kennedy Cazares MD Internal Medicine [phone]            Plan for follow-up on next business day.      Lindsay Casiano RN

## 2024-06-12 ENCOUNTER — CARE COORDINATION (OUTPATIENT)
Dept: CARE COORDINATION | Age: 64
End: 2024-06-12

## 2024-06-12 NOTE — CARE COORDINATION
Care Transitions Note  Initial Call - Call within 2 business days of discharge: Yes    Attempt #2 to reach patient for transitions of care follow up. Unable to reach patient. Left VM. CTN sign off if no return call received. Pt left AMA.     Outreach Attempts:   Multiple attempts to contact patient at phone numbers on file.     Patient: Erick Valentine    Patient : 1960   MRN: [id number]    Reason for Admission: COPD exacerbation  Discharge Date: 6/10/24  RURS: Readmission Risk Score: 12.3    Last Discharge Facility       Date Complaint Diagnosis Description Type Department Provider    24 Shoulder Pain; Dizziness COPD exacerbation (HCC) ED to Hosp-Admission (Discharged) (ADMITTED) Jacqui Charles MD; Azeem Marshall...            Was this an external facility discharge? No    Follow Up Appointment:   Patient does not have a follow up appointment scheduled at time of call.  Unable to reach  Future Appointments         Provider Specialty Dept Phone    2024 10:30 AM Kennedy Cazares MD Internal Medicine [phone]            No further follow-up call indicated     Lindsay Casiano RN

## 2024-06-29 NOTE — DISCHARGE SUMMARY
GLUCOPHAGE  Take 1 tablet by mouth 2 times daily (with meals)              No discharge procedures on file.    Time Spent on discharge is  31 mins in patient examination, evaluation, counseling as well as medication reconciliation, prescriptions for required medications, discharge plan and follow up.    Electronically signed by   ASHLYN PASTRANA NP  6/29/2024  4:01 PM      Thank you Kennedy Luciano MD for the opportunity to be involved in this patient's care.

## 2024-07-24 ENCOUNTER — TELEPHONE (OUTPATIENT)
Dept: INTERNAL MEDICINE CLINIC | Age: 64
End: 2024-07-24

## 2024-07-24 ENCOUNTER — OFFICE VISIT (OUTPATIENT)
Dept: INTERNAL MEDICINE CLINIC | Age: 64
End: 2024-07-24
Payer: MEDICARE

## 2024-07-24 VITALS
HEART RATE: 71 BPM | WEIGHT: 237.4 LBS | DIASTOLIC BLOOD PRESSURE: 72 MMHG | OXYGEN SATURATION: 91 % | SYSTOLIC BLOOD PRESSURE: 128 MMHG | HEIGHT: 70 IN | BODY MASS INDEX: 33.99 KG/M2

## 2024-07-24 DIAGNOSIS — J96.01 ACUTE HYPOXIC RESPIRATORY FAILURE (HCC): ICD-10-CM

## 2024-07-24 DIAGNOSIS — E11.29 TYPE 2 DIABETES MELLITUS WITH OTHER DIABETIC KIDNEY COMPLICATION, WITHOUT LONG-TERM CURRENT USE OF INSULIN (HCC): ICD-10-CM

## 2024-07-24 DIAGNOSIS — R42 VERTIGO: ICD-10-CM

## 2024-07-24 DIAGNOSIS — H81.13 BENIGN PAROXYSMAL POSITIONAL VERTIGO DUE TO BILATERAL VESTIBULAR DISORDER: ICD-10-CM

## 2024-07-24 DIAGNOSIS — Z12.11 COLON CANCER SCREENING: ICD-10-CM

## 2024-07-24 DIAGNOSIS — E66.01 CLASS 2 SEVERE OBESITY DUE TO EXCESS CALORIES WITH SERIOUS COMORBIDITY AND BODY MASS INDEX (BMI) OF 35.0 TO 35.9 IN ADULT (HCC): ICD-10-CM

## 2024-07-24 DIAGNOSIS — Z00.00 WELCOME TO MEDICARE PREVENTIVE VISIT: Primary | ICD-10-CM

## 2024-07-24 PROCEDURE — 3074F SYST BP LT 130 MM HG: CPT | Performed by: INTERNAL MEDICINE

## 2024-07-24 PROCEDURE — G0402 INITIAL PREVENTIVE EXAM: HCPCS | Performed by: INTERNAL MEDICINE

## 2024-07-24 PROCEDURE — 3017F COLORECTAL CA SCREEN DOC REV: CPT | Performed by: INTERNAL MEDICINE

## 2024-07-24 PROCEDURE — 1123F ACP DISCUSS/DSCN MKR DOCD: CPT | Performed by: INTERNAL MEDICINE

## 2024-07-24 PROCEDURE — 3046F HEMOGLOBIN A1C LEVEL >9.0%: CPT | Performed by: INTERNAL MEDICINE

## 2024-07-24 PROCEDURE — 3078F DIAST BP <80 MM HG: CPT | Performed by: INTERNAL MEDICINE

## 2024-07-24 RX ORDER — INSULIN GLARGINE 100 [IU]/ML
20 INJECTION, SOLUTION SUBCUTANEOUS NIGHTLY
Qty: 5 ADJUSTABLE DOSE PRE-FILLED PEN SYRINGE | Refills: 0 | Status: SHIPPED | OUTPATIENT
Start: 2024-07-24 | End: 2024-07-24

## 2024-07-24 RX ORDER — INSULIN GLARGINE 100 [IU]/ML
20 INJECTION, SOLUTION SUBCUTANEOUS NIGHTLY
Qty: 5 ADJUSTABLE DOSE PRE-FILLED PEN SYRINGE | Refills: 0 | Status: SHIPPED | OUTPATIENT
Start: 2024-07-24 | End: 2024-07-24 | Stop reason: SDUPTHER

## 2024-07-24 RX ORDER — ZOSTER VACCINE RECOMBINANT, ADJUVANTED 50 MCG/0.5
0.5 KIT INTRAMUSCULAR SEE ADMIN INSTRUCTIONS
Qty: 0.5 ML | Refills: 0 | Status: SHIPPED | OUTPATIENT
Start: 2024-07-24 | End: 2025-01-20

## 2024-07-24 NOTE — TELEPHONE ENCOUNTER
Floyd from [facility] called to inform that they received referral request from our office  but unfortunately [facility] is not in network with their office.    Called patient and left voice mail informing to return my call

## 2024-07-24 NOTE — PROGRESS NOTES
Visit Information    Have you changed or started any medications since your last visit including any over-the-counter medicines, vitamins, or herbal medicines? no   Are you having any side effects from any of your medications? -  no  Have you stopped taking any of your medications? Is so, why? -  no    Have you seen any other physician or provider since your last visit? Yes - Records Obtained  Have you had any other diagnostic tests since your last visit? Yes - Records Obtained  Have you been seen in the emergency room and/or had an admission to a hospital since we last saw you? Yes - Records Obtained  Have you had your routine dental cleaning in the past 6 months? yes -     Have you activated your [employer] account? If not, what are your barriers? Yes     Patient Care Team:  Kennedy Cazares MD as PCP - General (Internal Medicine)  Kennedy Cazares MD as PCP - Empaneled Provider  Coty Cornejo MD as Consulting Physician (Gastroenterology)    Medical History Review  Past Medical, Family, and Social History reviewed and does contribute to the patient presenting condition    Health Maintenance   Topic Date Due    COVID-19 Vaccine (1) Never done    Pneumococcal 65+ years Vaccine (1 of 2 - PCV) Never done    HIV screen  Never done    Diabetic retinal exam  Never done    Hepatitis C screen  Never done    DTaP/Tdap/Td vaccine (1 - Tdap) Never done    Colorectal Cancer Screen  Never done    Shingles vaccine (1 of 2) Never done    Respiratory Syncytial Virus (RSV) Pregnant or age 60 yrs+ (1 - 1-dose 60+ series) Never done    Diabetic foot exam  10/13/2023    AAA screen  Never done    Annual Wellness Visit (Medicare Advantage)  Never done    Flu vaccine (1) 08/01/2024    A1C test (Diabetic or Prediabetic)  08/28/2024    Diabetic Alb to Cr ratio (uACR) test  05/28/2025    Lipids  05/28/2025    Depression Screen  05/28/2025    GFR test (Diabetes, CKD 3-4, OR last GFR 15-59)  06/10/2025    Hepatitis A vaccine  Aged Out

## 2024-07-24 NOTE — PROGRESS NOTES
Medicare Annual Wellness Visit    Erick Valentine is here for Medicare AWV    Assessment & Plan   Welcome to Medicare preventive visit  Acute hypoxic respiratory failure (HCC)  Type 2 diabetes mellitus with other diabetic kidney complication, without long-term current use of insulin (HCC)  -     Levon Vanegas MD, Ophthalmology, [facility] DIABETES FOOT EXAM  -     External Referral To Endocrinology  Class 2 severe obesity due to excess calories with serious comorbidity and body mass index (BMI) of 35.0 to 35.9 in adult ([facility])  Colon cancer screening  -     Peña Cobb MD, Gastroenterology, [place]  Benign paroxysmal positional vertigo due to bilateral vestibular disorder  -     Enrrique Chinchilla MD, Otolaryngology, [place]  -     CT HEAD W CONTRAST; Future  Vertigo  -     Enrrique Chinchilla MD, Otolaryngology, [place]  -     CT HEAD W CONTRAST; Future  Difficult pt to work with  Did not do cologaurd ,michelle has mutliple box at home  Did not come for almost 2 years  Not taking any meds  Stopped all meds  A1c 12 or more  [facility]  Lack of insight  Agreable to see endo  Ref for colonsopcy  Pt refusing  oral meds  Not sure of insulin  Ct brain negative  Ref to ent for vestubular therapy for BPV  Will start insulin long acting  Recommendations for Preventive Services Due: see orders and patient instructions/AVS.  Recommended screening schedule for the next 5-10 years is provided to the patient in written form: see Patient Instructions/AVS.     No follow-ups on file.     Subjective       Patient's complete Health Risk Assessment and screening values have been reviewed and are found in Flowsheets. The following problems were reviewed today and where indicated follow up appointments were made and/or referrals ordered.    Positive Risk Factor Screenings with Interventions:    Fall Risk:  Do you feel unsteady or are you worried about falling? : (!) yes  2 or more falls in

## 2024-07-24 NOTE — TELEPHONE ENCOUNTER
Patient was made aware that the [facility] on [place] is closed at this time, please resend to [facility] / [place]

## 2024-07-24 NOTE — PATIENT INSTRUCTIONS
[employer].   Care instructions adapted under license by [employer]. If you have questions about a medical condition or this instruction, always ask your healthcare professional. [employer] disclaims any warranty or liability for your use of this information.      Personalized Preventive Plan for Erick Valentine - 7/24/2024  Medicare offers a range of preventive health benefits. Some of the tests and screenings are paid in full while other may be subject to a deductible, co-insurance, and/or copay.    Some of these benefits include a comprehensive review of your medical history including lifestyle, illnesses that may run in your family, and various assessments and screenings as appropriate.    After reviewing your medical record and screening and assessments performed today your provider may have ordered immunizations, labs, imaging, and/or referrals for you.  A list of these orders (if applicable) as well as your Preventive Care list are included within your After Visit Summary for your review.    Other Preventive Recommendations:    A preventive eye exam performed by an eye specialist is recommended every 1-2 years to screen for glaucoma; cataracts, macular degeneration, and other eye disorders.  A preventive dental visit is recommended every 6 months.  Try to get at least 150 minutes of exercise per week or 10,000 steps per day on a pedometer .  Order or download the FREE \"Exercise & Physical Activity: Your Everyday Guide\" from [employer]. Call 1-810.574.3203 or search [employer] online.  You need 5448-0092 mg of calcium and 6396-2235 IU of vitamin D per day. It is possible to meet your calcium requirement with diet alone, but a vitamin D supplement is usually necessary to meet this goal.  When exposed to the sun, use a sunscreen that protects against both UVA and UVB radiation with an SPF of 30 or greater. Reapply every 2 to 3 hours or

## 2024-07-25 RX ORDER — INSULIN GLARGINE 100 [IU]/ML
20 INJECTION, SOLUTION SUBCUTANEOUS NIGHTLY
Qty: 5 ADJUSTABLE DOSE PRE-FILLED PEN SYRINGE | Refills: 0 | Status: SHIPPED | OUTPATIENT
Start: 2024-07-25

## 2024-07-25 NOTE — TELEPHONE ENCOUNTER
Left voicemail to patient with instructions to contact his insurance provider to obtain a list of ENT physicians that are covered, in order for our office to send appropriate referral.

## 2024-07-26 ENCOUNTER — TELEPHONE (OUTPATIENT)
Dept: INTERNAL MEDICINE CLINIC | Age: 64
End: 2024-07-26

## 2024-07-26 RX ORDER — BISACODYL 5 MG/1
TABLET, DELAYED RELEASE ORAL
Qty: 4 TABLET | Refills: 0 | Status: SHIPPED | OUTPATIENT
Start: 2024-07-26

## 2024-07-26 RX ORDER — POLYETHYLENE GLYCOL 3350 17 G/17G
POWDER, FOR SOLUTION ORAL
Qty: 238 G | Refills: 0 | Status: SHIPPED | OUTPATIENT
Start: 2024-07-26

## 2024-07-26 NOTE — TELEPHONE ENCOUNTER
Contacted patient with further instruction on how to receive this list from [facility] [employer]. Patient states he will give them a call and give us a call back with more information to proceed.

## 2024-07-26 NOTE — TELEPHONE ENCOUNTER
Procedure scheduled/Dr Rdz  Procedure:Colonoscopy.  Dx: Screen.  Date:9/23/24.  Time:10:15 AM/ 8:15 AM Arrival.  Hospital:[facility] phone call:FILIBERTO.  Bowel Prep instructions given:Miralax/ Dulcolax.  In office/via phone: Phone/ Mailed bowel prep instructions.  Clearance needed: N/A.

## 2024-07-26 NOTE — TELEPHONE ENCOUNTER
----- Message from Efrain Garrido sent at 7/26/2024 12:48 PM EDT -----  Regarding: ECC Message to Provider  ECC Message to Provider    Relationship to Patient: Self     Additional Information. Patient wanted to inform Doctor Kennedy Cazares that he did not receive the list of the ENT doctors that is in network by his insurance because he had a problem accessing his My chart and his email account. Wanted to know if there is any way they can send the list over to him.    Patient also mentioned that he will be having the first appointment for his specialty doctor for diabetes will be on January 10, 2025.  --------------------------------------------------------------------------------------------------------------------------    Call Back Information: OK to leave message on voicemail  Preferred Call Back Number: Phone  [phone]

## 2024-07-29 RX ORDER — PEN NEEDLE, DIABETIC 30 GX5/16"
1 NEEDLE, DISPOSABLE MISCELLANEOUS DAILY
Qty: 100 EACH | Refills: 3 | Status: SHIPPED | OUTPATIENT
Start: 2024-07-29

## 2024-09-04 ENCOUNTER — TELEPHONE (OUTPATIENT)
Dept: INTERNAL MEDICINE CLINIC | Age: 64
End: 2024-09-04

## 2024-09-17 ENCOUNTER — HOSPITAL ENCOUNTER (OUTPATIENT)
Dept: PREADMISSION TESTING | Age: 64
Discharge: HOME | End: 2024-09-21

## 2024-09-17 VITALS — HEIGHT: 71 IN | WEIGHT: 245 LBS | BODY MASS INDEX: 34.3 KG/M2

## 2024-09-20 ENCOUNTER — ANESTHESIA EVENT (OUTPATIENT)
Dept: ENDOSCOPY | Age: 64
End: 2024-09-20
Payer: MEDICARE

## 2024-09-23 ENCOUNTER — HOSPITAL ENCOUNTER (OUTPATIENT)
Age: 64
Setting detail: OUTPATIENT SURGERY
Discharge: HOME | End: 2024-09-23
Attending: INTERNAL MEDICINE | Admitting: INTERNAL MEDICINE
Payer: MEDICARE

## 2024-09-23 ENCOUNTER — ANESTHESIA (OUTPATIENT)
Dept: ENDOSCOPY | Age: 64
End: 2024-09-23
Payer: MEDICARE

## 2024-09-23 VITALS
HEART RATE: 70 BPM | WEIGHT: 245 LBS | DIASTOLIC BLOOD PRESSURE: 94 MMHG | RESPIRATION RATE: 23 BRPM | SYSTOLIC BLOOD PRESSURE: 146 MMHG | HEIGHT: 71 IN | TEMPERATURE: 97.7 F | BODY MASS INDEX: 34.3 KG/M2 | OXYGEN SATURATION: 96 %

## 2024-09-23 LAB — GLUCOSE BLD-MCNC: 287 MG/DL (ref 75–110)

## 2024-09-23 PROCEDURE — 82947 ASSAY GLUCOSE BLOOD QUANT: CPT

## 2024-09-23 PROCEDURE — 2580000003 HC RX 258: Performed by: ANESTHESIOLOGY

## 2024-09-23 PROCEDURE — 2500000003 HC RX 250 WO HCPCS: Performed by: NURSE ANESTHETIST, CERTIFIED REGISTERED

## 2024-09-23 PROCEDURE — 7100000010 HC PHASE II RECOVERY - FIRST 15 MIN: Performed by: INTERNAL MEDICINE

## 2024-09-23 PROCEDURE — 3700000001 HC ADD 15 MINUTES (ANESTHESIA): Performed by: INTERNAL MEDICINE

## 2024-09-23 PROCEDURE — 3609027000 HC COLONOSCOPY: Performed by: INTERNAL MEDICINE

## 2024-09-23 PROCEDURE — 2709999900 HC NON-CHARGEABLE SUPPLY: Performed by: INTERNAL MEDICINE

## 2024-09-23 PROCEDURE — 6360000002 HC RX W HCPCS: Performed by: NURSE ANESTHETIST, CERTIFIED REGISTERED

## 2024-09-23 PROCEDURE — 7100000011 HC PHASE II RECOVERY - ADDTL 15 MIN: Performed by: INTERNAL MEDICINE

## 2024-09-23 PROCEDURE — G0121 COLON CA SCRN NOT HI RSK IND: HCPCS | Performed by: INTERNAL MEDICINE

## 2024-09-23 PROCEDURE — 2500000003 HC RX 250 WO HCPCS: Performed by: ANESTHESIOLOGY

## 2024-09-23 PROCEDURE — 3700000000 HC ANESTHESIA ATTENDED CARE: Performed by: INTERNAL MEDICINE

## 2024-09-23 RX ORDER — LIDOCAINE HYDROCHLORIDE 10 MG/ML
1 INJECTION, SOLUTION EPIDURAL; INFILTRATION; INTRACAUDAL; PERINEURAL
Status: COMPLETED | OUTPATIENT
Start: 2024-09-23 | End: 2024-09-23

## 2024-09-23 RX ORDER — LIDOCAINE HYDROCHLORIDE 20 MG/ML
INJECTION, SOLUTION EPIDURAL; INFILTRATION; INTRACAUDAL; PERINEURAL
Status: DISCONTINUED | OUTPATIENT
Start: 2024-09-23 | End: 2024-09-23 | Stop reason: SDUPTHER

## 2024-09-23 RX ORDER — SODIUM CHLORIDE 9 MG/ML
INJECTION, SOLUTION INTRAVENOUS CONTINUOUS
Status: DISCONTINUED | OUTPATIENT
Start: 2024-09-23 | End: 2024-09-23 | Stop reason: HOSPADM

## 2024-09-23 RX ORDER — SODIUM CHLORIDE 9 MG/ML
INJECTION, SOLUTION INTRAVENOUS PRN
Status: DISCONTINUED | OUTPATIENT
Start: 2024-09-23 | End: 2024-09-23 | Stop reason: HOSPADM

## 2024-09-23 RX ORDER — SODIUM CHLORIDE 0.9 % (FLUSH) 0.9 %
5-40 SYRINGE (ML) INJECTION PRN
Status: DISCONTINUED | OUTPATIENT
Start: 2024-09-23 | End: 2024-09-23 | Stop reason: HOSPADM

## 2024-09-23 RX ORDER — PROPOFOL 10 MG/ML
INJECTION, EMULSION INTRAVENOUS
Status: DISCONTINUED | OUTPATIENT
Start: 2024-09-23 | End: 2024-09-23 | Stop reason: SDUPTHER

## 2024-09-23 RX ORDER — SODIUM CHLORIDE 0.9 % (FLUSH) 0.9 %
5-40 SYRINGE (ML) INJECTION EVERY 12 HOURS SCHEDULED
Status: DISCONTINUED | OUTPATIENT
Start: 2024-09-23 | End: 2024-09-23 | Stop reason: HOSPADM

## 2024-09-23 RX ADMIN — LIDOCAINE HYDROCHLORIDE 1 ML: 10 INJECTION, SOLUTION EPIDURAL; INFILTRATION; INTRACAUDAL; PERINEURAL at 09:36

## 2024-09-23 RX ADMIN — LIDOCAINE HYDROCHLORIDE 100 MG: 20 INJECTION, SOLUTION EPIDURAL; INFILTRATION; INTRACAUDAL; PERINEURAL at 10:07

## 2024-09-23 RX ADMIN — SODIUM CHLORIDE: 9 INJECTION, SOLUTION INTRAVENOUS at 09:36

## 2024-09-23 RX ADMIN — PROPOFOL 150 MCG/KG/MIN: 10 INJECTION, EMULSION INTRAVENOUS at 10:07

## 2024-09-23 RX ADMIN — PROPOFOL 80 MG: 10 INJECTION, EMULSION INTRAVENOUS at 10:07

## 2024-09-23 ASSESSMENT — PAIN - FUNCTIONAL ASSESSMENT: PAIN_FUNCTIONAL_ASSESSMENT: NONE - DENIES PAIN

## 2024-09-23 ASSESSMENT — ENCOUNTER SYMPTOMS
GASTROINTESTINAL NEGATIVE: 1
RESPIRATORY NEGATIVE: 1

## 2024-10-01 RX ORDER — INSULIN GLARGINE 100 [IU]/ML
20 INJECTION, SOLUTION SUBCUTANEOUS NIGHTLY
Qty: 5 ADJUSTABLE DOSE PRE-FILLED PEN SYRINGE | Refills: 0 | Status: SHIPPED | OUTPATIENT
Start: 2024-10-01

## 2024-11-12 ENCOUNTER — ENROLLMENT (OUTPATIENT)
Dept: PHARMACY | Facility: CLINIC | Age: 64
End: 2024-11-12

## 2024-11-13 DIAGNOSIS — E11.29 TYPE 2 DIABETES MELLITUS WITH OTHER DIABETIC KIDNEY COMPLICATION, WITHOUT LONG-TERM CURRENT USE OF INSULIN (HCC): Primary | ICD-10-CM

## 2024-11-13 RX ORDER — BLOOD-GLUCOSE SENSOR
EACH MISCELLANEOUS
Qty: 1 EACH | Refills: 1 | Status: SHIPPED | OUTPATIENT
Start: 2024-11-13

## 2024-12-02 ENCOUNTER — COMMUNITY OUTREACH (OUTPATIENT)
Dept: INTERNAL MEDICINE CLINIC | Age: 64
End: 2024-12-02

## 2024-12-04 ENCOUNTER — OFFICE VISIT (OUTPATIENT)
Dept: INTERNAL MEDICINE CLINIC | Age: 64
End: 2024-12-04
Payer: MEDICARE

## 2024-12-04 VITALS
SYSTOLIC BLOOD PRESSURE: 132 MMHG | BODY MASS INDEX: 36.65 KG/M2 | WEIGHT: 256 LBS | OXYGEN SATURATION: 97 % | HEART RATE: 91 BPM | DIASTOLIC BLOOD PRESSURE: 72 MMHG | HEIGHT: 70 IN

## 2024-12-04 DIAGNOSIS — R20.2 RIGHT HAND PARESTHESIA: ICD-10-CM

## 2024-12-04 DIAGNOSIS — E11.29 TYPE 2 DIABETES MELLITUS WITH OTHER DIABETIC KIDNEY COMPLICATION, WITHOUT LONG-TERM CURRENT USE OF INSULIN (HCC): Primary | ICD-10-CM

## 2024-12-04 LAB — HBA1C MFR BLD: 11.4 %

## 2024-12-04 PROCEDURE — 2022F DILAT RTA XM EVC RTNOPTHY: CPT | Performed by: INTERNAL MEDICINE

## 2024-12-04 PROCEDURE — 1123F ACP DISCUSS/DSCN MKR DOCD: CPT | Performed by: INTERNAL MEDICINE

## 2024-12-04 PROCEDURE — 3017F COLORECTAL CA SCREEN DOC REV: CPT | Performed by: INTERNAL MEDICINE

## 2024-12-04 PROCEDURE — G8417 CALC BMI ABV UP PARAM F/U: HCPCS | Performed by: INTERNAL MEDICINE

## 2024-12-04 PROCEDURE — 3078F DIAST BP <80 MM HG: CPT | Performed by: INTERNAL MEDICINE

## 2024-12-04 PROCEDURE — G8427 DOCREV CUR MEDS BY ELIG CLIN: HCPCS | Performed by: INTERNAL MEDICINE

## 2024-12-04 PROCEDURE — 99214 OFFICE O/P EST MOD 30 MIN: CPT | Performed by: INTERNAL MEDICINE

## 2024-12-04 PROCEDURE — 3075F SYST BP GE 130 - 139MM HG: CPT | Performed by: INTERNAL MEDICINE

## 2024-12-04 PROCEDURE — 83036 HEMOGLOBIN GLYCOSYLATED A1C: CPT | Performed by: INTERNAL MEDICINE

## 2024-12-04 PROCEDURE — 3046F HEMOGLOBIN A1C LEVEL >9.0%: CPT | Performed by: INTERNAL MEDICINE

## 2024-12-04 PROCEDURE — G8484 FLU IMMUNIZE NO ADMIN: HCPCS | Performed by: INTERNAL MEDICINE

## 2024-12-04 PROCEDURE — 1036F TOBACCO NON-USER: CPT | Performed by: INTERNAL MEDICINE

## 2024-12-04 PROCEDURE — 1159F MED LIST DOCD IN RCRD: CPT | Performed by: INTERNAL MEDICINE

## 2024-12-04 RX ORDER — MAGNESIUM GLUCONATE 27 MG(500)
500 TABLET ORAL 2 TIMES DAILY
COMMUNITY

## 2024-12-04 RX ORDER — INSULIN GLARGINE 100 [IU]/ML
25 INJECTION, SOLUTION SUBCUTANEOUS NIGHTLY
Qty: 5 ADJUSTABLE DOSE PRE-FILLED PEN SYRINGE | Refills: 0 | Status: SHIPPED | OUTPATIENT
Start: 2024-12-04

## 2024-12-04 RX ORDER — GABAPENTIN 100 MG/1
100 CAPSULE ORAL 2 TIMES DAILY
Qty: 60 CAPSULE | Refills: 1 | Status: SHIPPED | OUTPATIENT
Start: 2024-12-04 | End: 2025-02-02

## 2024-12-04 NOTE — PROGRESS NOTES
n\"Have you been to the ER, urgent care clinic since your last visit?  Hospitalized since your last visit?\"    9/23/24 colonoscopy     “Have you seen or consulted any other health care providers outside our system since your last visit?”    NO               SUBJECTIVE:  Erick Valentine is a 64 y.o. male patient who  comes for complaints of   Chief Complaint   Patient presents with    Diabetes     A1C- 5/28/24= 12.9     Wrist Pain     Left, fingers locking  Numbness and tingling, painful to make fist       Right hadn pain- numbness/burning pain  Known carpal tunnel  Had h/o fractuer of right wrist bone   Known T2DM      DIABETES MELLITUS:    diagnosed more than 5 years ago  Modifying factors on med:   Severity: un/controlled   Associated signs and symtoms: neuropathy   aggravated with sugar diet and better with low sugar diet      - Follows a diabetic diet most of the time.   -Is compliant with medication(s) and is tolerating med(s) without any side e  Hemoglobin A1C   Date Value Ref Range Status   12/04/2024 11.4 % Final   05/28/2024 12.9 % Final   10/13/2022 7.3 % Final   Never checks BG- refused testing supplies today   Has been on 20U nightly - increase to 25U hs   Refusing metformin,amaryl    Has appt to see endocrinology next mth        Hand and foot numbness/tingling/old fracture pains recurrign   Takes ASA daily- recommend reducing to 81mg daily-patient does not want to     HYPERTENSION:    Onset more than 2 years ago  Liana: mild to mod  Usually controlled with current po meds:   Not associated with headaches or blurry vision  No chest pain   -- Patient denies any side effects of their medication(s) and is compliant with their regimen.      Last 3 Encounter BP Readings:     Date:        BP:  BP Readings from Last 3 Encounters:   12/04/24 132/72   09/23/24 (!) 146/94   07/24/24 128/72     Not on any meds for BP      HYPERLIPIDEMIA:   Patient reports doing well on current therapy of statin:  allergic to

## 2024-12-05 DIAGNOSIS — E11.29 TYPE 2 DIABETES MELLITUS WITH OTHER DIABETIC KIDNEY COMPLICATION, WITHOUT LONG-TERM CURRENT USE OF INSULIN (HCC): ICD-10-CM

## 2024-12-05 RX ORDER — INSULIN GLARGINE 100 [IU]/ML
INJECTION, SOLUTION SUBCUTANEOUS
Qty: 15 ML | OUTPATIENT
Start: 2024-12-05

## 2024-12-06 DIAGNOSIS — E11.29 TYPE 2 DIABETES MELLITUS WITH OTHER DIABETIC KIDNEY COMPLICATION, WITHOUT LONG-TERM CURRENT USE OF INSULIN (HCC): ICD-10-CM

## 2024-12-06 RX ORDER — INSULIN GLARGINE 100 [IU]/ML
INJECTION, SOLUTION SUBCUTANEOUS
Qty: 15 ML | OUTPATIENT
Start: 2024-12-06

## 2024-12-16 DIAGNOSIS — E11.29 TYPE 2 DIABETES MELLITUS WITH OTHER DIABETIC KIDNEY COMPLICATION, WITHOUT LONG-TERM CURRENT USE OF INSULIN (HCC): ICD-10-CM

## 2024-12-16 RX ORDER — INSULIN GLARGINE 100 [IU]/ML
25 INJECTION, SOLUTION SUBCUTANEOUS NIGHTLY
Qty: 5 ADJUSTABLE DOSE PRE-FILLED PEN SYRINGE | Refills: 0 | Status: SHIPPED | OUTPATIENT
Start: 2024-12-16

## 2025-01-03 DIAGNOSIS — R20.2 RIGHT HAND PARESTHESIA: ICD-10-CM

## 2025-01-03 RX ORDER — GABAPENTIN 100 MG/1
100 CAPSULE ORAL 2 TIMES DAILY
Qty: 120 CAPSULE | Refills: 1 | Status: SHIPPED | OUTPATIENT
Start: 2025-01-03 | End: 2025-03-04

## 2025-01-22 ENCOUNTER — TELEPHONE (OUTPATIENT)
Dept: INTERNAL MEDICINE CLINIC | Age: 65
End: 2025-01-22

## 2025-01-22 NOTE — TELEPHONE ENCOUNTER
Patient's medicare  called asking if you can order a sleep study for the patient due to the patient having a hard time sleeping.    Also please send order for freestyle rob 3 plus sensors to belinda matos as the patient never received them from [employer] in November and is not currently checking his sugars.    Also would like office notes faxed, unable to print these at this time. I explained to Chapar[facility] that once this got fixed I would fax them    Please advise

## 2025-01-23 RX ORDER — HYDROCHLOROTHIAZIDE 12.5 MG/1
CAPSULE ORAL
Qty: 1 EACH | Refills: 1 | Status: SHIPPED | OUTPATIENT
Start: 2025-01-23

## 2025-02-06 ENCOUNTER — OFFICE VISIT (OUTPATIENT)
Dept: INTERNAL MEDICINE CLINIC | Age: 65
End: 2025-02-06
Payer: MEDICARE

## 2025-02-06 VITALS
HEART RATE: 71 BPM | BODY MASS INDEX: 36.01 KG/M2 | SYSTOLIC BLOOD PRESSURE: 130 MMHG | DIASTOLIC BLOOD PRESSURE: 72 MMHG | OXYGEN SATURATION: 97 % | WEIGHT: 251 LBS

## 2025-02-06 DIAGNOSIS — M25.521 RIGHT ELBOW PAIN: ICD-10-CM

## 2025-02-06 DIAGNOSIS — E11.29 TYPE 2 DIABETES MELLITUS WITH OTHER DIABETIC KIDNEY COMPLICATION, WITHOUT LONG-TERM CURRENT USE OF INSULIN (HCC): Primary | ICD-10-CM

## 2025-02-06 DIAGNOSIS — E66.812 CLASS 2 SEVERE OBESITY DUE TO EXCESS CALORIES WITH SERIOUS COMORBIDITY AND BODY MASS INDEX (BMI) OF 36.0 TO 36.9 IN ADULT: ICD-10-CM

## 2025-02-06 DIAGNOSIS — I10 ESSENTIAL HYPERTENSION: ICD-10-CM

## 2025-02-06 DIAGNOSIS — R49.0 HOARSE VOICE QUALITY: ICD-10-CM

## 2025-02-06 DIAGNOSIS — K21.9 GASTROESOPHAGEAL REFLUX DISEASE WITHOUT ESOPHAGITIS: ICD-10-CM

## 2025-02-06 DIAGNOSIS — E66.01 CLASS 2 SEVERE OBESITY DUE TO EXCESS CALORIES WITH SERIOUS COMORBIDITY AND BODY MASS INDEX (BMI) OF 36.0 TO 36.9 IN ADULT: ICD-10-CM

## 2025-02-06 PROCEDURE — 1036F TOBACCO NON-USER: CPT | Performed by: INTERNAL MEDICINE

## 2025-02-06 PROCEDURE — 1159F MED LIST DOCD IN RCRD: CPT | Performed by: INTERNAL MEDICINE

## 2025-02-06 PROCEDURE — 1123F ACP DISCUSS/DSCN MKR DOCD: CPT | Performed by: INTERNAL MEDICINE

## 2025-02-06 PROCEDURE — 3075F SYST BP GE 130 - 139MM HG: CPT | Performed by: INTERNAL MEDICINE

## 2025-02-06 PROCEDURE — 2022F DILAT RTA XM EVC RTNOPTHY: CPT | Performed by: INTERNAL MEDICINE

## 2025-02-06 PROCEDURE — 3046F HEMOGLOBIN A1C LEVEL >9.0%: CPT | Performed by: INTERNAL MEDICINE

## 2025-02-06 PROCEDURE — 99214 OFFICE O/P EST MOD 30 MIN: CPT | Performed by: INTERNAL MEDICINE

## 2025-02-06 PROCEDURE — G8427 DOCREV CUR MEDS BY ELIG CLIN: HCPCS | Performed by: INTERNAL MEDICINE

## 2025-02-06 PROCEDURE — G8417 CALC BMI ABV UP PARAM F/U: HCPCS | Performed by: INTERNAL MEDICINE

## 2025-02-06 PROCEDURE — 3078F DIAST BP <80 MM HG: CPT | Performed by: INTERNAL MEDICINE

## 2025-02-06 PROCEDURE — 3017F COLORECTAL CA SCREEN DOC REV: CPT | Performed by: INTERNAL MEDICINE

## 2025-02-06 RX ORDER — ACYCLOVIR 400 MG/1
1 TABLET ORAL
Qty: 2 EACH | Refills: 0 | COMMUNITY
Start: 2025-02-06 | End: 2025-02-06 | Stop reason: CLARIF

## 2025-02-06 SDOH — ECONOMIC STABILITY: FOOD INSECURITY: WITHIN THE PAST 12 MONTHS, THE FOOD YOU BOUGHT JUST DIDN'T LAST AND YOU DIDN'T HAVE MONEY TO GET MORE.: NEVER TRUE

## 2025-02-06 SDOH — ECONOMIC STABILITY: FOOD INSECURITY: WITHIN THE PAST 12 MONTHS, YOU WORRIED THAT YOUR FOOD WOULD RUN OUT BEFORE YOU GOT MONEY TO BUY MORE.: NEVER TRUE

## 2025-02-06 ASSESSMENT — PATIENT HEALTH QUESTIONNAIRE - PHQ9
SUM OF ALL RESPONSES TO PHQ QUESTIONS 1-9: 0
2. FEELING DOWN, DEPRESSED OR HOPELESS: NOT AT ALL
SUM OF ALL RESPONSES TO PHQ9 QUESTIONS 1 & 2: 0
1. LITTLE INTEREST OR PLEASURE IN DOING THINGS: NOT AT ALL
SUM OF ALL RESPONSES TO PHQ QUESTIONS 1-9: 0

## 2025-02-06 ASSESSMENT — ENCOUNTER SYMPTOMS
COUGH: 0
BLOOD IN STOOL: 0
TROUBLE SWALLOWING: 0
SHORTNESS OF BREATH: 0
EYE PAIN: 0
WHEEZING: 0
DIARRHEA: 0
COLOR CHANGE: 0
EYE DISCHARGE: 0
ABDOMINAL DISTENTION: 0

## 2025-02-06 NOTE — PROGRESS NOTES
[facility]  [address] [address]  Dept: [phone]  Dept Fax: [phone]    Erick Valentine is a 64 y.o. male who presents today for his medical conditions/complaintsas noted below.  Erick Valentine is c/o of   Chief Complaint   Patient presents with    Diabetes    Check-Up     Right arm sores and mucus comes out of it          HPI:     HTN  Onset more than 2 years ago  staci mild to mod  Controlled with current po meds  Not associated with headaches or blurry vision  No chest pain    Diabetes   Duration more than 7 years  Modifying factors on Glucophage and other med  Severity uncontrolled sever  Associated signs and symtoms neuropathy/ckd/ CAD.   aggravated with sugar diet and better with low sugar diet               Hemoglobin A1C (%)   Date Value   12/04/2024 11.4   05/28/2024 12.9   10/13/2022 7.3             ( goal A1Cis < 7)   No components found for: \"LABMICR\"  No components found for: \"LDLCHOLESTEROL\", \"LDLCALC\"    (goal LDL is <100)   AST (U/L)   Date Value   05/28/2024 16     ALT (U/L)   Date Value   05/28/2024 20     BUN (mg/dL)   Date Value   06/10/2024 29 (H)     BP Readings from Last 3 Encounters:   02/06/25 130/72   12/04/24 132/72   09/23/24 (!) 146/94          (goal 120/80)    Past Medical History:   Diagnosis Date    Acute bronchitis     Allergic rhinitis, cause unspecified     Backache, unspecified     Cervicalgia     Cough     Disorders of sacrum     Esophageal reflux     Obesity (BMI 30-39.9)     Occlusion and stenosis of carotid artery without mention of cerebral infarction     2005    Other abnormal glucose     Other and unspecified hyperlipidemia     Other specified forms of chronic ischemic heart disease     Pain in joint, pelvic region and thigh     PONV (postoperative nausea and vomiting)     Ringing in the ears     Temporomandibular joint disorders, unspecified     Umbilical hernia     Unspecified essential hypertension     no meds

## 2025-02-07 ENCOUNTER — TELEPHONE (OUTPATIENT)
Dept: ADMINISTRATIVE | Age: 65
End: 2025-02-07

## 2025-02-10 ENCOUNTER — OFFICE VISIT (OUTPATIENT)
Dept: ORTHOPEDIC SURGERY | Age: 65
End: 2025-02-10
Payer: MEDICARE

## 2025-02-10 VITALS — HEIGHT: 70 IN | RESPIRATION RATE: 14 BRPM | BODY MASS INDEX: 35.93 KG/M2 | WEIGHT: 251 LBS

## 2025-02-10 DIAGNOSIS — G56.21 CUBITAL TUNNEL SYNDROME ON RIGHT: Primary | ICD-10-CM

## 2025-02-10 DIAGNOSIS — M25.521 RIGHT ELBOW PAIN: ICD-10-CM

## 2025-02-10 DIAGNOSIS — M25.521 RIGHT ELBOW PAIN: Primary | ICD-10-CM

## 2025-02-10 DIAGNOSIS — M65.30 TRIGGER FINGER OF RIGHT HAND, UNSPECIFIED FINGER: ICD-10-CM

## 2025-02-10 PROCEDURE — 99204 OFFICE O/P NEW MOD 45 MIN: CPT

## 2025-02-10 PROCEDURE — 3017F COLORECTAL CA SCREEN DOC REV: CPT

## 2025-02-10 PROCEDURE — G8428 CUR MEDS NOT DOCUMENT: HCPCS

## 2025-02-10 PROCEDURE — 1123F ACP DISCUSS/DSCN MKR DOCD: CPT

## 2025-02-10 PROCEDURE — G8417 CALC BMI ABV UP PARAM F/U: HCPCS

## 2025-02-10 PROCEDURE — 1036F TOBACCO NON-USER: CPT

## 2025-02-10 PROCEDURE — 1125F AMNT PAIN NOTED PAIN PRSNT: CPT

## 2025-02-10 RX ORDER — DICLOFENAC SODIUM 75 MG/1
75 TABLET, DELAYED RELEASE ORAL 2 TIMES DAILY WITH MEALS
Qty: 28 TABLET | Refills: 0 | Status: SHIPPED | OUTPATIENT
Start: 2025-02-10 | End: 2025-02-24

## 2025-02-10 NOTE — PROGRESS NOTES
Orthopedic Elbow Encounter Note - New Patient    Chief complaint: Right elbow pain    HPI: Erick Valentine is a 64 y.o. right-hand dominant male who presents for evaluation of His right elbow.  Patient states he has been dealing with pain into this elbow for for 30+ years now.  He states that is becoming more bothersome.  He states that he gets some radicular symptoms starting at the elbow extending down into the ulnar aspect of his hand specifically the 3rd through 5th digits.  States that he also experiences some \"snapping\" into those digits.  States that he does play guitar but has not played it much recently due to his dysfunction and pain that he is experienced.  He endorses the pain mostly over the medial aspect of his elbow with radiation and associated numbness/tingling.    Previous treatment:    NSAIDs: None; has taken aspirin    Injections: None    Physical therapy: None    Surgeries: None; does have prior history of right wrist injury and does have restriction with flexion and extension.    Review of Systems:     Constitution: no fever or chills   Pain level: 3/10  Musculoskeletal: As noted in the HPI   Neurologic: Numbness and tingling into the 3rd through 5th digits.    Past Medical Hx, Past Surgical Hx, Medications, Allergies, Social Hx:   These were all reviewed. Please refer to Electronic Medical Records for details.     Physical Exam:   Resp 14   Ht 1.778 m (5' 10\")   Wt 113.9 kg (251 lb)   BMI 36.01 kg/m²    General Appearance: alert, well appearing, and in no distress  Mental Status: alert, oriented to person, place, and time  Gait: normal    Elbows:    Skin: warm and dry, no rash or erythema.  2 small recurrent lesions over the medial aspect of the upper arm.  No ecchymosis or obvious swelling.  Vasculature: 2+ radial pulses bilaterally  Sensation: Intact to light touch in radial, ulnar, and median nerve distributions bilaterally.    ROM:

## 2025-02-22 DIAGNOSIS — M25.521 RIGHT ELBOW PAIN: ICD-10-CM

## 2025-02-24 RX ORDER — DICLOFENAC SODIUM 75 MG/1
TABLET, DELAYED RELEASE ORAL
Qty: 28 TABLET | Refills: 0 | Status: SHIPPED | OUTPATIENT
Start: 2025-02-24

## 2025-02-24 NOTE — TELEPHONE ENCOUNTER
Santy,     Pharmacy requesting refill of voltaren 75 mg BID 28# 0 refills. Last refill and last office visit on 2/10/25.

## 2025-02-28 DIAGNOSIS — M25.521 RIGHT ELBOW PAIN: ICD-10-CM

## 2025-03-01 DIAGNOSIS — E11.29 TYPE 2 DIABETES MELLITUS WITH OTHER DIABETIC KIDNEY COMPLICATION, WITHOUT LONG-TERM CURRENT USE OF INSULIN (HCC): ICD-10-CM

## 2025-03-03 RX ORDER — INSULIN GLARGINE 100 [IU]/ML
INJECTION, SOLUTION SUBCUTANEOUS
Qty: 15 ML | Refills: 3 | Status: SHIPPED | OUTPATIENT
Start: 2025-03-03

## 2025-03-06 RX ORDER — DICLOFENAC SODIUM 75 MG/1
TABLET, DELAYED RELEASE ORAL
Qty: 28 TABLET | Refills: 0 | OUTPATIENT
Start: 2025-03-06

## 2025-03-13 ENCOUNTER — OFFICE VISIT (OUTPATIENT)
Dept: INTERNAL MEDICINE CLINIC | Age: 65
End: 2025-03-13
Payer: MEDICARE

## 2025-03-13 VITALS
SYSTOLIC BLOOD PRESSURE: 118 MMHG | HEART RATE: 86 BPM | WEIGHT: 251 LBS | OXYGEN SATURATION: 98 % | DIASTOLIC BLOOD PRESSURE: 72 MMHG | BODY MASS INDEX: 35.93 KG/M2 | HEIGHT: 70 IN

## 2025-03-13 DIAGNOSIS — M50.30 DEGENERATIVE DISC DISEASE, CERVICAL: ICD-10-CM

## 2025-03-13 DIAGNOSIS — I10 ESSENTIAL HYPERTENSION: ICD-10-CM

## 2025-03-13 DIAGNOSIS — E66.01 CLASS 2 SEVERE OBESITY DUE TO EXCESS CALORIES WITH SERIOUS COMORBIDITY AND BODY MASS INDEX (BMI) OF 36.0 TO 36.9 IN ADULT: ICD-10-CM

## 2025-03-13 DIAGNOSIS — E11.29 TYPE 2 DIABETES MELLITUS WITH OTHER DIABETIC KIDNEY COMPLICATION, WITHOUT LONG-TERM CURRENT USE OF INSULIN (HCC): Primary | ICD-10-CM

## 2025-03-13 DIAGNOSIS — E66.812 CLASS 2 SEVERE OBESITY DUE TO EXCESS CALORIES WITH SERIOUS COMORBIDITY AND BODY MASS INDEX (BMI) OF 36.0 TO 36.9 IN ADULT: ICD-10-CM

## 2025-03-13 LAB — HBA1C MFR BLD: 14 %

## 2025-03-13 PROCEDURE — 1159F MED LIST DOCD IN RCRD: CPT | Performed by: INTERNAL MEDICINE

## 2025-03-13 PROCEDURE — 3078F DIAST BP <80 MM HG: CPT | Performed by: INTERNAL MEDICINE

## 2025-03-13 PROCEDURE — 3046F HEMOGLOBIN A1C LEVEL >9.0%: CPT | Performed by: INTERNAL MEDICINE

## 2025-03-13 PROCEDURE — 99214 OFFICE O/P EST MOD 30 MIN: CPT | Performed by: INTERNAL MEDICINE

## 2025-03-13 PROCEDURE — 3017F COLORECTAL CA SCREEN DOC REV: CPT | Performed by: INTERNAL MEDICINE

## 2025-03-13 PROCEDURE — 1123F ACP DISCUSS/DSCN MKR DOCD: CPT | Performed by: INTERNAL MEDICINE

## 2025-03-13 PROCEDURE — G8417 CALC BMI ABV UP PARAM F/U: HCPCS | Performed by: INTERNAL MEDICINE

## 2025-03-13 PROCEDURE — 1036F TOBACCO NON-USER: CPT | Performed by: INTERNAL MEDICINE

## 2025-03-13 PROCEDURE — 2022F DILAT RTA XM EVC RTNOPTHY: CPT | Performed by: INTERNAL MEDICINE

## 2025-03-13 PROCEDURE — G8427 DOCREV CUR MEDS BY ELIG CLIN: HCPCS | Performed by: INTERNAL MEDICINE

## 2025-03-13 PROCEDURE — 83036 HEMOGLOBIN GLYCOSYLATED A1C: CPT | Performed by: INTERNAL MEDICINE

## 2025-03-13 PROCEDURE — 3074F SYST BP LT 130 MM HG: CPT | Performed by: INTERNAL MEDICINE

## 2025-03-13 RX ORDER — ALBUTEROL SULFATE 90 UG/1
2 INHALANT RESPIRATORY (INHALATION) EVERY 6 HOURS PRN
COMMUNITY
Start: 2025-02-22

## 2025-03-13 ASSESSMENT — ENCOUNTER SYMPTOMS
SHORTNESS OF BREATH: 0
ABDOMINAL DISTENTION: 0
BLOOD IN STOOL: 0
EYE PAIN: 0
WHEEZING: 0
DIARRHEA: 0
EYE DISCHARGE: 0
COLOR CHANGE: 0
COUGH: 0
TROUBLE SWALLOWING: 0

## 2025-03-13 NOTE — PROGRESS NOTES
[facility]  [address] [address]  Dept: [phone]  Dept Fax: [phone]    Erick Valentine is a 64 y.o. male who presents today for his medical conditions/complaintsas noted below.  Erick Valentine is c/o of   Chief Complaint   Patient presents with    Diabetes     Follow up         HPI:     HTN  Onset more than 2 years ago  staci mild to mod  Controlled with current po meds  Not associated with headaches or blurry vision  No chest pain    Diabetes   Duration more than 7 years  Modifying factors on Glucophage and other med  Severity uncontrolled sever  Associated signs and symtoms neuropathy/ckd/ CAD.   aggravated with sugar diet and better with low sugar diet               Hemoglobin A1C (%)   Date Value   03/13/2025 14.0   12/04/2024 11.4   05/28/2024 12.9             ( goal A1Cis < 7)   No components found for: \"LABMICR\"  No components found for: \"LDLCHOLESTEROL\", \"LDLCALC\"    (goal LDL is <100)   AST (U/L)   Date Value   05/28/2024 16     ALT (U/L)   Date Value   05/28/2024 20     BUN (mg/dL)   Date Value   06/10/2024 29 (H)     BP Readings from Last 3 Encounters:   03/13/25 118/72   02/13/25 (!) 143/93   02/06/25 130/72          (goal 120/80)    Past Medical History:   Diagnosis Date    Acute bronchitis     Allergic rhinitis, cause unspecified     Backache, unspecified     Cervicalgia     Cough     Disorders of sacrum     Esophageal reflux     Obesity (BMI 30-39.9)     Occlusion and stenosis of carotid artery without mention of cerebral infarction     2005    Other abnormal glucose     Other and unspecified hyperlipidemia     Other specified forms of chronic ischemic heart disease     Pain in joint, pelvic region and thigh     PONV (postoperative nausea and vomiting)     Ringing in the ears     Temporomandibular joint disorders, unspecified     Umbilical hernia     Unspecified essential hypertension     no meds    Unspecified vitamin D deficiency

## 2025-03-14 ENCOUNTER — TELEPHONE (OUTPATIENT)
Dept: INTERNAL MEDICINE CLINIC | Age: 65
End: 2025-03-14

## 2025-03-14 NOTE — TELEPHONE ENCOUNTER
Patient called into the office and stated that he called Dr. Patel's office and Dr. Cazares has to call the office to get the patient in. Please advise.

## 2025-03-14 NOTE — TELEPHONE ENCOUNTER
I dont have to call   Pt can call and make pt  If there is a waiting time ,itsok  I can keep managing his blood sugar til he sees endocrinology

## 2025-03-17 NOTE — TELEPHONE ENCOUNTER
Called patient and explained that PCP does not call for scheduling. Patient states that he was told PCP needed to call. Writer called and was told that the referral was closed due to patient being a no show in January.     Writer called Endo, and was told they will send a message to management to have patient reinstated and will call patient to schedule once a decision is made. They will call office back if it's denied.

## 2025-03-17 NOTE — TELEPHONE ENCOUNTER
Let me know if anything needs to be done from my side  You can re pend ref if needed for same doc  Or with dr larios

## 2025-03-31 ENCOUNTER — HOSPITAL ENCOUNTER (OUTPATIENT)
Dept: SLEEP CENTER | Age: 65
Discharge: HOME | End: 2025-04-02
Attending: STUDENT IN AN ORGANIZED HEALTH CARE EDUCATION/TRAINING PROGRAM
Payer: MEDICARE

## 2025-03-31 DIAGNOSIS — G47.30 SLEEP-DISORDERED BREATHING: ICD-10-CM

## 2025-03-31 PROCEDURE — 95810 POLYSOM 6/> YRS 4/> PARAM: CPT

## 2025-04-01 VITALS
HEIGHT: 70 IN | OXYGEN SATURATION: 90 % | BODY MASS INDEX: 36.22 KG/M2 | RESPIRATION RATE: 14 BRPM | HEART RATE: 89 BPM | WEIGHT: 253 LBS

## 2025-04-01 ASSESSMENT — SLEEP AND FATIGUE QUESTIONNAIRES
ESS TOTAL SCORE: 11
HOW LIKELY ARE YOU TO NOD OFF OR FALL ASLEEP WHILE LYING DOWN TO REST IN THE AFTERNOON WHEN CIRCUMSTANCES PERMIT: MODERATE CHANCE OF DOZING
HOW LIKELY ARE YOU TO NOD OFF OR FALL ASLEEP WHILE SITTING QUIETLY AFTER LUNCH WITHOUT ALCOHOL: SLIGHT CHANCE OF DOZING
HOW LIKELY ARE YOU TO NOD OFF OR FALL ASLEEP WHILE SITTING AND READING: MODERATE CHANCE OF DOZING
HOW LIKELY ARE YOU TO NOD OFF OR FALL ASLEEP WHILE WATCHING TV: MODERATE CHANCE OF DOZING
HOW LIKELY ARE YOU TO NOD OFF OR FALL ASLEEP WHILE SITTING AND TALKING TO SOMEONE: SLIGHT CHANCE OF DOZING
HOW LIKELY ARE YOU TO NOD OFF OR FALL ASLEEP IN A CAR, WHILE STOPPED FOR A FEW MINUTES IN TRAFFIC: SLIGHT CHANCE OF DOZING
HOW LIKELY ARE YOU TO NOD OFF OR FALL ASLEEP WHILE SITTING INACTIVE IN A PUBLIC PLACE: SLIGHT CHANCE OF DOZING
HOW LIKELY ARE YOU TO NOD OFF OR FALL ASLEEP WHEN YOU ARE A PASSENGER IN A CAR FOR AN HOUR WITHOUT A BREAK: SLIGHT CHANCE OF DOZING

## 2025-04-07 PROBLEM — G47.30 SLEEP-DISORDERED BREATHING: Status: ACTIVE | Noted: 2025-04-07

## 2025-04-09 ENCOUNTER — TELEPHONE (OUTPATIENT)
Dept: INTERNAL MEDICINE CLINIC | Age: 65
End: 2025-04-09

## 2025-04-09 DIAGNOSIS — E11.29 TYPE 2 DIABETES MELLITUS WITH OTHER DIABETIC KIDNEY COMPLICATION, WITHOUT LONG-TERM CURRENT USE OF INSULIN: Primary | ICD-10-CM

## 2025-04-09 NOTE — TELEPHONE ENCOUNTER
from [facility] called stating she just got off the phone with Dr. Patel's office and they stated they need a new referral place because they were unable to reach patient to schedule so they closed the referral. Once a new referral is replaced the patient will be able to call and schedule.    I have the referral pending please sign

## 2025-04-10 ENCOUNTER — OFFICE VISIT (OUTPATIENT)
Dept: ORTHOPEDIC SURGERY | Age: 65
End: 2025-04-10
Payer: MEDICARE

## 2025-04-10 VITALS — HEIGHT: 70 IN | RESPIRATION RATE: 14 BRPM | BODY MASS INDEX: 36.22 KG/M2 | WEIGHT: 253 LBS

## 2025-04-10 DIAGNOSIS — M65.30 TRIGGER FINGER OF RIGHT HAND, UNSPECIFIED FINGER: ICD-10-CM

## 2025-04-10 DIAGNOSIS — G56.21 CUBITAL TUNNEL SYNDROME ON RIGHT: Primary | ICD-10-CM

## 2025-04-10 PROCEDURE — 3017F COLORECTAL CA SCREEN DOC REV: CPT

## 2025-04-10 PROCEDURE — 1123F ACP DISCUSS/DSCN MKR DOCD: CPT

## 2025-04-10 PROCEDURE — G8428 CUR MEDS NOT DOCUMENT: HCPCS

## 2025-04-10 PROCEDURE — 1125F AMNT PAIN NOTED PAIN PRSNT: CPT

## 2025-04-10 PROCEDURE — G8417 CALC BMI ABV UP PARAM F/U: HCPCS

## 2025-04-10 PROCEDURE — 1036F TOBACCO NON-USER: CPT

## 2025-04-10 PROCEDURE — 99213 OFFICE O/P EST LOW 20 MIN: CPT

## 2025-04-10 RX ORDER — DICLOFENAC SODIUM 75 MG/1
75 TABLET, DELAYED RELEASE ORAL 2 TIMES DAILY WITH MEALS
Qty: 28 TABLET | Refills: 0 | Status: SHIPPED | OUTPATIENT
Start: 2025-04-10 | End: 2025-04-24

## 2025-04-10 NOTE — PROGRESS NOTES
proceed with the EMG/nerve conduction study to the right upper extremity as a lot of his symptoms do appear to be consistent with cubital tunnel syndrome.  Patient states she would like to hold off on any injection at this time and will proceed with the study as previously ordered.  I will follow-up with him after the study is completed to review that as well as discuss treatment options moving forward.  Patient is amenable to plan as outlined above and was encouraged to contact our office with any questions or concerns that he may have.      Total time spent on visit: 25 minutes          This note is created with the assistance of a speech recognition program.  While intending to generate adocument that actually reflects the content of the visit, the document can still have some errors including those of syntax and sound a like substitutions which may escape proof reading.  It such instances, actual meaningcan be extrapolated by contextual diversion.

## 2025-04-21 RX ORDER — DICLOFENAC SODIUM 75 MG/1
TABLET, DELAYED RELEASE ORAL
Qty: 28 TABLET | Refills: 0 | Status: SHIPPED | OUTPATIENT
Start: 2025-04-21

## 2025-05-08 DIAGNOSIS — G56.21 CUBITAL TUNNEL SYNDROME ON RIGHT: Primary | ICD-10-CM

## 2025-05-08 DIAGNOSIS — M65.30 TRIGGER FINGER OF RIGHT HAND, UNSPECIFIED FINGER: ICD-10-CM

## 2025-05-08 RX ORDER — DICLOFENAC SODIUM 75 MG/1
TABLET, DELAYED RELEASE ORAL
Qty: 28 TABLET | Refills: 0 | Status: SHIPPED | OUTPATIENT
Start: 2025-05-08

## 2025-05-08 NOTE — TELEPHONE ENCOUNTER
Voltaren refill request from pharmacy for cubital tunnel syndrome and trigger finger.    This would be patient's 3rd script for Voltaren. Last filled on 4/21/25.

## 2025-05-19 DIAGNOSIS — G56.21 CUBITAL TUNNEL SYNDROME ON RIGHT: ICD-10-CM

## 2025-05-19 DIAGNOSIS — M65.30 TRIGGER FINGER OF RIGHT HAND, UNSPECIFIED FINGER: ICD-10-CM

## 2025-05-20 RX ORDER — DICLOFENAC SODIUM 75 MG/1
TABLET, DELAYED RELEASE ORAL
Qty: 28 TABLET | Refills: 0 | Status: SHIPPED | OUTPATIENT
Start: 2025-05-20

## 2025-05-28 ENCOUNTER — TELEPHONE (OUTPATIENT)
Dept: INTERNAL MEDICINE CLINIC | Age: 65
End: 2025-05-28

## 2025-05-31 DIAGNOSIS — G56.21 CUBITAL TUNNEL SYNDROME ON RIGHT: ICD-10-CM

## 2025-05-31 DIAGNOSIS — M65.30 TRIGGER FINGER OF RIGHT HAND, UNSPECIFIED FINGER: ICD-10-CM

## 2025-06-04 RX ORDER — DICLOFENAC SODIUM 75 MG/1
TABLET, DELAYED RELEASE ORAL
Qty: 28 TABLET | Refills: 0 | OUTPATIENT
Start: 2025-06-04

## 2025-06-04 NOTE — TELEPHONE ENCOUNTER
Attempted to contact patient to inquire if Voltaren was previously effective and consequently patient was requesting refill request. Left voicemail encouraging patient to call the office back.

## 2025-06-10 ENCOUNTER — PATIENT MESSAGE (OUTPATIENT)
Dept: INTERNAL MEDICINE CLINIC | Age: 65
End: 2025-06-10

## 2025-07-10 ENCOUNTER — TELEPHONE (OUTPATIENT)
Dept: INTERNAL MEDICINE CLINIC | Age: 65
End: 2025-07-10

## (undated) DEVICE — ENDO KIT W/SYRINGE: Brand: MEDLINE INDUSTRIES, INC.

## (undated) DEVICE — DEFENDO AIR WATER SUCTION AND BIOPSY VALVE KIT FOR  OLYMPUS: Brand: DEFENDO AIR/WATER/SUCTION AND BIOPSY VALVE